# Patient Record
Sex: FEMALE | Race: WHITE | NOT HISPANIC OR LATINO | Employment: OTHER | URBAN - METROPOLITAN AREA
[De-identification: names, ages, dates, MRNs, and addresses within clinical notes are randomized per-mention and may not be internally consistent; named-entity substitution may affect disease eponyms.]

---

## 2017-03-24 ENCOUNTER — HOSPITAL ENCOUNTER (OUTPATIENT)
Dept: FAMILY MEDICINE CLINIC | Facility: CLINIC | Age: 62
Setting detail: SPECIMEN
Discharge: HOME | End: 2017-03-24
Attending: FAMILY MEDICINE | Admitting: FAMILY MEDICINE

## 2017-03-24 ENCOUNTER — CONVERSION ENCOUNTER (OUTPATIENT)
Dept: FAMILY MEDICINE CLINIC | Facility: CLINIC | Age: 62
End: 2017-03-24

## 2017-03-24 LAB
ALBUMIN SERPL-MCNC: 3.8 G/DL (ref 3.5–4.8)
ALBUMIN/GLOB SERPL: 1 {RATIO} (ref 1–1.7)
ALP SERPL-CCNC: 96 IU/L (ref 32–91)
ALT SERPL-CCNC: 22 IU/L (ref 14–54)
ANION GAP SERPL CALC-SCNC: 13.3 MMOL/L (ref 10–20)
AST SERPL-CCNC: 23 IU/L (ref 15–41)
BASOPHILS # BLD AUTO: 0 10*3/UL (ref 0–0.2)
BASOPHILS NFR BLD AUTO: 1 % (ref 0–2)
BILIRUB SERPL-MCNC: 0.7 MG/DL (ref 0.3–1.2)
BUN SERPL-MCNC: 10 MG/DL (ref 8–20)
BUN/CREAT SERPL: 14.3 (ref 5.4–26.2)
CALCIUM SERPL-MCNC: 9.8 MG/DL (ref 8.9–10.3)
CHLORIDE SERPL-SCNC: 103 MMOL/L (ref 101–111)
CHOLEST SERPL-MCNC: 251 MG/DL
CHOLEST/HDLC SERPL: 3.9 {RATIO}
CONV CO2: 28 MMOL/L (ref 22–32)
CONV LDL CHOLESTEROL DIRECT: 157 MG/DL (ref 0–100)
CONV TOTAL PROTEIN: 7.6 G/DL (ref 6.1–7.9)
CREAT UR-MCNC: 0.7 MG/DL (ref 0.4–1)
DIFFERENTIAL METHOD BLD: (no result)
EOSINOPHIL # BLD AUTO: 0.1 10*3/UL (ref 0–0.3)
EOSINOPHIL # BLD AUTO: 1 % (ref 0–3)
ERYTHROCYTE [DISTWIDTH] IN BLOOD BY AUTOMATED COUNT: 16.5 % (ref 11.5–14.5)
GLOBULIN UR ELPH-MCNC: 3.8 G/DL (ref 2.5–3.8)
GLUCOSE SERPL-MCNC: 95 MG/DL (ref 65–99)
HCT VFR BLD AUTO: 35 % (ref 35–49)
HDLC SERPL-MCNC: 64 MG/DL
HGB BLD-MCNC: 11.7 G/DL (ref 12–15)
LDLC/HDLC SERPL: 2.5 {RATIO}
LIPID INTERPRETATION: ABNORMAL
LYMPHOCYTES # BLD AUTO: 2.2 10*3/UL (ref 0.8–4.8)
LYMPHOCYTES NFR BLD AUTO: 28 % (ref 18–42)
MCH RBC QN AUTO: 27.4 PG (ref 26–32)
MCHC RBC AUTO-ENTMCNC: 33.3 G/DL (ref 32–36)
MCV RBC AUTO: 82.2 FL (ref 80–94)
MONOCYTES # BLD AUTO: 0.7 10*3/UL (ref 0.1–1.3)
MONOCYTES NFR BLD AUTO: 9 % (ref 2–11)
NEUTROPHILS # BLD AUTO: 4.8 10*3/UL (ref 2.3–8.6)
NEUTROPHILS NFR BLD AUTO: 61 % (ref 50–75)
NRBC BLD AUTO-RTO: 0 /100{WBCS}
NRBC/RBC NFR BLD MANUAL: 0 10*3/UL
PLATELET # BLD AUTO: 348 10*3/UL (ref 150–450)
PMV BLD AUTO: 7.4 FL (ref 7.4–10.4)
POTASSIUM SERPL-SCNC: 4.3 MMOL/L (ref 3.6–5.1)
RBC # BLD AUTO: 4.26 10*6/UL (ref 4–5.4)
SODIUM SERPL-SCNC: 140 MMOL/L (ref 136–144)
TRIGL SERPL-MCNC: 158 MG/DL
VLDLC SERPL CALC-MCNC: 30 MG/DL
WBC # BLD AUTO: 7.8 10*3/UL (ref 4.5–11.5)

## 2017-08-09 ENCOUNTER — ON CAMPUS - OUTPATIENT (AMBULATORY)
Dept: URBAN - METROPOLITAN AREA HOSPITAL 2 | Facility: HOSPITAL | Age: 62
End: 2017-08-09
Payer: COMMERCIAL

## 2017-08-09 VITALS
OXYGEN SATURATION: 98 % | OXYGEN SATURATION: 100 % | HEIGHT: 64 IN | SYSTOLIC BLOOD PRESSURE: 126 MMHG | RESPIRATION RATE: 16 BRPM | OXYGEN SATURATION: 96 % | SYSTOLIC BLOOD PRESSURE: 106 MMHG | HEART RATE: 49 BPM | HEART RATE: 61 BPM | SYSTOLIC BLOOD PRESSURE: 161 MMHG | DIASTOLIC BLOOD PRESSURE: 64 MMHG | TEMPERATURE: 97.7 F | OXYGEN SATURATION: 99 % | DIASTOLIC BLOOD PRESSURE: 49 MMHG | WEIGHT: 213 LBS | DIASTOLIC BLOOD PRESSURE: 66 MMHG | HEART RATE: 67 BPM | HEART RATE: 65 BPM | DIASTOLIC BLOOD PRESSURE: 90 MMHG | SYSTOLIC BLOOD PRESSURE: 100 MMHG | DIASTOLIC BLOOD PRESSURE: 86 MMHG | RESPIRATION RATE: 18 BRPM | SYSTOLIC BLOOD PRESSURE: 140 MMHG | DIASTOLIC BLOOD PRESSURE: 60 MMHG | HEART RATE: 69 BPM

## 2017-08-09 DIAGNOSIS — Z12.11 ENCOUNTER FOR SCREENING FOR MALIGNANT NEOPLASM OF COLON: ICD-10-CM

## 2017-08-09 DIAGNOSIS — K64.1 SECOND DEGREE HEMORRHOIDS: ICD-10-CM

## 2017-08-09 PROCEDURE — 45378 DIAGNOSTIC COLONOSCOPY: CPT | Mod: 33 | Performed by: INTERNAL MEDICINE

## 2017-08-09 RX ADMIN — PROPOFOL: 10 INJECTION, EMULSION INTRAVENOUS at 09:57

## 2017-08-17 ENCOUNTER — CONVERSION ENCOUNTER (OUTPATIENT)
Dept: FAMILY MEDICINE CLINIC | Facility: CLINIC | Age: 62
End: 2017-08-17

## 2017-09-25 ENCOUNTER — CONVERSION ENCOUNTER (OUTPATIENT)
Dept: FAMILY MEDICINE CLINIC | Facility: CLINIC | Age: 62
End: 2017-09-25

## 2017-09-25 ENCOUNTER — HOSPITAL ENCOUNTER (OUTPATIENT)
Dept: FAMILY MEDICINE CLINIC | Facility: CLINIC | Age: 62
Setting detail: SPECIMEN
Discharge: HOME | End: 2017-09-25
Attending: FAMILY MEDICINE | Admitting: FAMILY MEDICINE

## 2017-11-08 ENCOUNTER — HOSPITAL ENCOUNTER (OUTPATIENT)
Dept: FAMILY MEDICINE CLINIC | Facility: CLINIC | Age: 62
Setting detail: SPECIMEN
Discharge: HOME | End: 2017-11-08
Attending: FAMILY MEDICINE | Admitting: FAMILY MEDICINE

## 2017-11-08 LAB
ALBUMIN SERPL-MCNC: 3.9 G/DL (ref 3.5–4.8)
ALBUMIN/GLOB SERPL: 1.2 {RATIO} (ref 1–1.7)
ALP SERPL-CCNC: 86 IU/L (ref 32–91)
ALT SERPL-CCNC: 20 IU/L (ref 14–54)
ANION GAP SERPL CALC-SCNC: 12.6 MMOL/L (ref 10–20)
AST SERPL-CCNC: 26 IU/L (ref 15–41)
BASOPHILS # BLD AUTO: 0 10*3/UL (ref 0–0.2)
BASOPHILS NFR BLD AUTO: 1 % (ref 0–2)
BILIRUB SERPL-MCNC: 0.7 MG/DL (ref 0.3–1.2)
BUN SERPL-MCNC: 11 MG/DL (ref 8–20)
BUN/CREAT SERPL: 15.7 (ref 5.4–26.2)
CALCIUM SERPL-MCNC: 9.4 MG/DL (ref 8.9–10.3)
CHLORIDE SERPL-SCNC: 104 MMOL/L (ref 101–111)
CONV CO2: 27 MMOL/L (ref 22–32)
CONV TOTAL PROTEIN: 7.2 G/DL (ref 6.1–7.9)
CREAT UR-MCNC: 0.7 MG/DL (ref 0.4–1)
DIFFERENTIAL METHOD BLD: (no result)
EOSINOPHIL # BLD AUTO: 0.1 10*3/UL (ref 0–0.3)
EOSINOPHIL # BLD AUTO: 2 % (ref 0–3)
ERYTHROCYTE [DISTWIDTH] IN BLOOD BY AUTOMATED COUNT: 16.4 % (ref 11.5–14.5)
GLOBULIN UR ELPH-MCNC: 3.3 G/DL (ref 2.5–3.8)
GLUCOSE SERPL-MCNC: 93 MG/DL (ref 65–99)
HCT VFR BLD AUTO: 34 % (ref 35–49)
HGB BLD-MCNC: 10.8 G/DL (ref 12–15)
LYMPHOCYTES # BLD AUTO: 1.6 10*3/UL (ref 0.8–4.8)
LYMPHOCYTES NFR BLD AUTO: 29 % (ref 18–42)
MCH RBC QN AUTO: 25.6 PG (ref 26–32)
MCHC RBC AUTO-ENTMCNC: 31.8 G/DL (ref 32–36)
MCV RBC AUTO: 80.7 FL (ref 80–94)
MONOCYTES # BLD AUTO: 0.5 10*3/UL (ref 0.1–1.3)
MONOCYTES NFR BLD AUTO: 10 % (ref 2–11)
NEUTROPHILS # BLD AUTO: 3.2 10*3/UL (ref 2.3–8.6)
NEUTROPHILS NFR BLD AUTO: 58 % (ref 50–75)
NRBC BLD AUTO-RTO: 0 /100{WBCS}
NRBC/RBC NFR BLD MANUAL: 0 10*3/UL
PLATELET # BLD AUTO: 358 10*3/UL (ref 150–450)
PMV BLD AUTO: 7 FL (ref 7.4–10.4)
POTASSIUM SERPL-SCNC: 4.6 MMOL/L (ref 3.6–5.1)
RBC # BLD AUTO: 4.21 10*6/UL (ref 4–5.4)
SODIUM SERPL-SCNC: 139 MMOL/L (ref 136–144)
WBC # BLD AUTO: 5.4 10*3/UL (ref 4.5–11.5)

## 2018-01-23 ENCOUNTER — HOSPITAL ENCOUNTER (OUTPATIENT)
Dept: INFUSION THERAPY | Facility: HOSPITAL | Age: 63
Discharge: HOME | End: 2018-01-23
Attending: FAMILY MEDICINE | Admitting: FAMILY MEDICINE

## 2018-01-30 ENCOUNTER — HOSPITAL ENCOUNTER (OUTPATIENT)
Dept: INFUSION THERAPY | Facility: HOSPITAL | Age: 63
Discharge: HOME | End: 2018-01-30
Attending: FAMILY MEDICINE | Admitting: FAMILY MEDICINE

## 2018-04-10 ENCOUNTER — HOSPITAL ENCOUNTER (OUTPATIENT)
Dept: OTHER | Facility: HOSPITAL | Age: 63
Discharge: HOME | End: 2018-04-10
Attending: FAMILY MEDICINE | Admitting: FAMILY MEDICINE

## 2018-04-10 ENCOUNTER — CONVERSION ENCOUNTER (OUTPATIENT)
Dept: FAMILY MEDICINE CLINIC | Facility: CLINIC | Age: 63
End: 2018-04-10

## 2018-04-10 ENCOUNTER — HOSPITAL ENCOUNTER (OUTPATIENT)
Dept: FAMILY MEDICINE CLINIC | Facility: CLINIC | Age: 63
Setting detail: SPECIMEN
Discharge: HOME | End: 2018-04-10
Attending: FAMILY MEDICINE | Admitting: FAMILY MEDICINE

## 2018-04-10 LAB
ALBUMIN SERPL-MCNC: 4 G/DL (ref 3.5–4.8)
ALBUMIN/GLOB SERPL: 1.5 {RATIO} (ref 1–1.7)
ALP SERPL-CCNC: 86 IU/L (ref 32–91)
ALT SERPL-CCNC: 23 IU/L (ref 14–54)
ANION GAP SERPL CALC-SCNC: 10.8 MMOL/L (ref 10–20)
AST SERPL-CCNC: 21 IU/L (ref 15–41)
BASOPHILS # BLD AUTO: 0 10*3/UL (ref 0–0.2)
BASOPHILS NFR BLD AUTO: 0 % (ref 0–2)
BILIRUB SERPL-MCNC: 0.7 MG/DL (ref 0.3–1.2)
BUN SERPL-MCNC: 11 MG/DL (ref 8–20)
BUN/CREAT SERPL: 18.3 (ref 5.4–26.2)
CALCIUM SERPL-MCNC: 9.6 MG/DL (ref 8.9–10.3)
CHLORIDE SERPL-SCNC: 102 MMOL/L (ref 101–111)
CONV CO2: 28 MMOL/L (ref 22–32)
CONV TOTAL PROTEIN: 6.7 G/DL (ref 6.1–7.9)
CREAT UR-MCNC: 0.6 MG/DL (ref 0.4–1)
DIFFERENTIAL METHOD BLD: (no result)
EOSINOPHIL # BLD AUTO: 0.1 10*3/UL (ref 0–0.3)
EOSINOPHIL # BLD AUTO: 1 % (ref 0–3)
ERYTHROCYTE [DISTWIDTH] IN BLOOD BY AUTOMATED COUNT: 20.6 % (ref 11.5–14.5)
GLOBULIN UR ELPH-MCNC: 2.7 G/DL (ref 2.5–3.8)
GLUCOSE SERPL-MCNC: 89 MG/DL (ref 65–99)
HCT VFR BLD AUTO: 40.5 % (ref 35–49)
HGB BLD-MCNC: 13.7 G/DL (ref 12–15)
LYMPHOCYTES # BLD AUTO: 2.3 10*3/UL (ref 0.8–4.8)
LYMPHOCYTES NFR BLD AUTO: 26 % (ref 18–42)
MCH RBC QN AUTO: 29.6 PG (ref 26–32)
MCHC RBC AUTO-ENTMCNC: 33.7 G/DL (ref 32–36)
MCV RBC AUTO: 87.9 FL (ref 80–94)
MONOCYTES # BLD AUTO: 0.6 10*3/UL (ref 0.1–1.3)
MONOCYTES NFR BLD AUTO: 7 % (ref 2–11)
NEUTROPHILS # BLD AUTO: 5.7 10*3/UL (ref 2.3–8.6)
NEUTROPHILS NFR BLD AUTO: 66 % (ref 50–75)
NRBC BLD AUTO-RTO: 0 /100{WBCS}
NRBC/RBC NFR BLD MANUAL: 0 10*3/UL
PLATELET # BLD AUTO: 332 10*3/UL (ref 150–450)
PMV BLD AUTO: 6.6 FL (ref 7.4–10.4)
POTASSIUM SERPL-SCNC: 3.8 MMOL/L (ref 3.6–5.1)
RBC # BLD AUTO: 4.61 10*6/UL (ref 4–5.4)
SODIUM SERPL-SCNC: 137 MMOL/L (ref 136–144)
WBC # BLD AUTO: 8.7 10*3/UL (ref 4.5–11.5)

## 2018-04-12 ENCOUNTER — CONVERSION ENCOUNTER (OUTPATIENT)
Dept: FAMILY MEDICINE CLINIC | Facility: CLINIC | Age: 63
End: 2018-04-12

## 2018-04-12 ENCOUNTER — HOSPITAL ENCOUNTER (OUTPATIENT)
Dept: CARDIOLOGY | Facility: HOSPITAL | Age: 63
Discharge: HOME | End: 2018-04-12
Attending: INTERNAL MEDICINE | Admitting: INTERNAL MEDICINE

## 2018-10-18 ENCOUNTER — CONVERSION ENCOUNTER (OUTPATIENT)
Dept: FAMILY MEDICINE CLINIC | Facility: CLINIC | Age: 63
End: 2018-10-18

## 2018-10-18 ENCOUNTER — HOSPITAL ENCOUNTER (OUTPATIENT)
Dept: FAMILY MEDICINE CLINIC | Facility: CLINIC | Age: 63
Setting detail: SPECIMEN
Discharge: HOME | End: 2018-10-18
Attending: FAMILY MEDICINE | Admitting: FAMILY MEDICINE

## 2018-10-18 LAB
25(OH)D3 SERPL-MCNC: 89 NG/ML (ref 30–100)
ALBUMIN SERPL-MCNC: 4.2 G/DL (ref 3.5–4.8)
ALBUMIN/GLOB SERPL: 1.5 {RATIO} (ref 1–1.7)
ALP SERPL-CCNC: 89 IU/L (ref 32–91)
ALT SERPL-CCNC: 19 IU/L (ref 14–54)
ANION GAP SERPL CALC-SCNC: 12.4 MMOL/L (ref 10–20)
AST SERPL-CCNC: 18 IU/L (ref 15–41)
BASOPHILS # BLD AUTO: 0 10*3/UL (ref 0–0.2)
BASOPHILS NFR BLD AUTO: 1 % (ref 0–2)
BILIRUB SERPL-MCNC: 0.7 MG/DL (ref 0.3–1.2)
BUN SERPL-MCNC: 7 MG/DL (ref 8–20)
BUN/CREAT SERPL: 11.7 (ref 5.4–26.2)
CALCIUM SERPL-MCNC: 9.7 MG/DL (ref 8.9–10.3)
CHLORIDE SERPL-SCNC: 100 MMOL/L (ref 101–111)
CHOLEST SERPL-MCNC: 131 MG/DL
CHOLEST/HDLC SERPL: 2.6 {RATIO}
CONV CO2: 29 MMOL/L (ref 22–32)
CONV LDL CHOLESTEROL DIRECT: 75 MG/DL (ref 0–100)
CONV TOTAL PROTEIN: 7 G/DL (ref 6.1–7.9)
CREAT UR-MCNC: 0.6 MG/DL (ref 0.4–1)
DIFFERENTIAL METHOD BLD: (no result)
EOSINOPHIL # BLD AUTO: 0.1 10*3/UL (ref 0–0.3)
EOSINOPHIL # BLD AUTO: 1 % (ref 0–3)
ERYTHROCYTE [DISTWIDTH] IN BLOOD BY AUTOMATED COUNT: 13.7 % (ref 11.5–14.5)
GLOBULIN UR ELPH-MCNC: 2.8 G/DL (ref 2.5–3.8)
GLUCOSE SERPL-MCNC: 100 MG/DL (ref 65–99)
HCT VFR BLD AUTO: 43.2 % (ref 35–49)
HDLC SERPL-MCNC: 50 MG/DL
HGB BLD-MCNC: 14.6 G/DL (ref 12–15)
LDLC/HDLC SERPL: 1.5 {RATIO}
LIPID INTERPRETATION: NORMAL
LYMPHOCYTES # BLD AUTO: 1.5 10*3/UL (ref 0.8–4.8)
LYMPHOCYTES NFR BLD AUTO: 21 % (ref 18–42)
MCH RBC QN AUTO: 31.1 PG (ref 26–32)
MCHC RBC AUTO-ENTMCNC: 33.8 G/DL (ref 32–36)
MCV RBC AUTO: 92 FL (ref 80–94)
MONOCYTES # BLD AUTO: 0.7 10*3/UL (ref 0.1–1.3)
MONOCYTES NFR BLD AUTO: 10 % (ref 2–11)
NEUTROPHILS # BLD AUTO: 4.8 10*3/UL (ref 2.3–8.6)
NEUTROPHILS NFR BLD AUTO: 67 % (ref 50–75)
NRBC BLD AUTO-RTO: 0 /100{WBCS}
NRBC/RBC NFR BLD MANUAL: 0 10*3/UL
PLATELET # BLD AUTO: 352 10*3/UL (ref 150–450)
PMV BLD AUTO: 7.5 FL (ref 7.4–10.4)
POTASSIUM SERPL-SCNC: 4.4 MMOL/L (ref 3.6–5.1)
RBC # BLD AUTO: 4.69 10*6/UL (ref 4–5.4)
SODIUM SERPL-SCNC: 137 MMOL/L (ref 136–144)
TRIGL SERPL-MCNC: 43 MG/DL
VIT B12 SERPL-MCNC: 605 PG/ML (ref 180–914)
VLDLC SERPL CALC-MCNC: 5.7 MG/DL
WBC # BLD AUTO: 7.1 10*3/UL (ref 4.5–11.5)

## 2018-12-13 ENCOUNTER — CONVERSION ENCOUNTER (OUTPATIENT)
Dept: FAMILY MEDICINE CLINIC | Facility: CLINIC | Age: 63
End: 2018-12-13

## 2019-04-15 ENCOUNTER — HOSPITAL ENCOUNTER (OUTPATIENT)
Dept: FAMILY MEDICINE CLINIC | Facility: CLINIC | Age: 64
Setting detail: SPECIMEN
Discharge: HOME | End: 2019-04-15
Attending: NURSE PRACTITIONER | Admitting: NURSE PRACTITIONER

## 2019-04-15 ENCOUNTER — CONVERSION ENCOUNTER (OUTPATIENT)
Dept: FAMILY MEDICINE CLINIC | Facility: CLINIC | Age: 64
End: 2019-04-15

## 2019-04-15 LAB
BASOPHILS # BLD AUTO: 0 10*3/UL (ref 0–0.2)
BASOPHILS NFR BLD AUTO: 1 % (ref 0–2)
DIFFERENTIAL METHOD BLD: (no result)
EOSINOPHIL # BLD AUTO: 0.1 10*3/UL (ref 0–0.3)
EOSINOPHIL # BLD AUTO: 2 % (ref 0–3)
ERYTHROCYTE [DISTWIDTH] IN BLOOD BY AUTOMATED COUNT: 12.8 % (ref 11.5–14.5)
HCT VFR BLD AUTO: 42.3 % (ref 35–49)
HGB BLD-MCNC: 14.3 G/DL (ref 12–15)
LYMPHOCYTES # BLD AUTO: 2.1 10*3/UL (ref 0.8–4.8)
LYMPHOCYTES NFR BLD AUTO: 30 % (ref 18–42)
MCH RBC QN AUTO: 32.2 PG (ref 26–32)
MCHC RBC AUTO-ENTMCNC: 33.9 G/DL (ref 32–36)
MCV RBC AUTO: 94.9 FL (ref 80–94)
MONOCYTES # BLD AUTO: 0.5 10*3/UL (ref 0.1–1.3)
MONOCYTES NFR BLD AUTO: 8 % (ref 2–11)
NEUTROPHILS # BLD AUTO: 4.2 10*3/UL (ref 2.3–8.6)
NEUTROPHILS NFR BLD AUTO: 59 % (ref 50–75)
NRBC BLD AUTO-RTO: 0 /100{WBCS}
NRBC/RBC NFR BLD MANUAL: 0 10*3/UL
PLATELET # BLD AUTO: 336 10*3/UL (ref 150–450)
PMV BLD AUTO: 7 FL (ref 7.4–10.4)
RBC # BLD AUTO: 4.46 10*6/UL (ref 4–5.4)
WBC # BLD AUTO: 7 10*3/UL (ref 4.5–11.5)

## 2019-06-04 VITALS
DIASTOLIC BLOOD PRESSURE: 85 MMHG | HEART RATE: 65 BPM | WEIGHT: 209.4 LBS | WEIGHT: 216 LBS | BODY MASS INDEX: 37.56 KG/M2 | DIASTOLIC BLOOD PRESSURE: 76 MMHG | HEART RATE: 61 BPM | WEIGHT: 220 LBS | RESPIRATION RATE: 16 BRPM | SYSTOLIC BLOOD PRESSURE: 128 MMHG | RESPIRATION RATE: 16 BRPM | DIASTOLIC BLOOD PRESSURE: 84 MMHG | OXYGEN SATURATION: 97 % | OXYGEN SATURATION: 97 % | HEART RATE: 85 BPM | OXYGEN SATURATION: 97 % | SYSTOLIC BLOOD PRESSURE: 140 MMHG | SYSTOLIC BLOOD PRESSURE: 132 MMHG | HEIGHT: 64 IN

## 2019-06-04 VITALS
WEIGHT: 183.2 LBS | HEIGHT: 64 IN | BODY MASS INDEX: 38.62 KG/M2 | BODY MASS INDEX: 28.91 KG/M2 | OXYGEN SATURATION: 96 % | DIASTOLIC BLOOD PRESSURE: 76 MMHG | BODY MASS INDEX: 38.17 KG/M2 | HEART RATE: 62 BPM | HEART RATE: 67 BPM | HEART RATE: 67 BPM | DIASTOLIC BLOOD PRESSURE: 83 MMHG | HEIGHT: 64 IN | SYSTOLIC BLOOD PRESSURE: 127 MMHG | SYSTOLIC BLOOD PRESSURE: 128 MMHG | HEIGHT: 64 IN | HEART RATE: 62 BPM | BODY MASS INDEX: 31.28 KG/M2 | SYSTOLIC BLOOD PRESSURE: 131 MMHG | DIASTOLIC BLOOD PRESSURE: 84 MMHG | SYSTOLIC BLOOD PRESSURE: 131 MMHG | RESPIRATION RATE: 16 BRPM | RESPIRATION RATE: 16 BRPM | RESPIRATION RATE: 16 BRPM | WEIGHT: 138.9 LBS | HEART RATE: 51 BPM | OXYGEN SATURATION: 97 % | DIASTOLIC BLOOD PRESSURE: 75 MMHG | SYSTOLIC BLOOD PRESSURE: 146 MMHG | OXYGEN SATURATION: 100 % | WEIGHT: 168.4 LBS | OXYGEN SATURATION: 94 % | WEIGHT: 225 LBS | DIASTOLIC BLOOD PRESSURE: 82 MMHG | WEIGHT: 223.6 LBS | BODY MASS INDEX: 23.71 KG/M2 | OXYGEN SATURATION: 99 %

## 2019-08-28 ENCOUNTER — APPOINTMENT (OUTPATIENT)
Dept: LAB | Facility: HOSPITAL | Age: 64
End: 2019-08-28

## 2019-08-28 ENCOUNTER — OFFICE VISIT (OUTPATIENT)
Dept: FAMILY MEDICINE CLINIC | Facility: CLINIC | Age: 64
End: 2019-08-28

## 2019-08-28 VITALS
TEMPERATURE: 98.2 F | WEIGHT: 135.5 LBS | HEIGHT: 64 IN | SYSTOLIC BLOOD PRESSURE: 127 MMHG | BODY MASS INDEX: 23.13 KG/M2 | RESPIRATION RATE: 20 BRPM | OXYGEN SATURATION: 98 % | HEART RATE: 52 BPM | DIASTOLIC BLOOD PRESSURE: 76 MMHG

## 2019-08-28 DIAGNOSIS — E78.5 HYPERLIPIDEMIA, UNSPECIFIED HYPERLIPIDEMIA TYPE: Primary | ICD-10-CM

## 2019-08-28 DIAGNOSIS — I10 HYPERTENSION, UNSPECIFIED TYPE: ICD-10-CM

## 2019-08-28 DIAGNOSIS — R59.0 PREAURICULAR LYMPHADENOPATHY: ICD-10-CM

## 2019-08-28 DIAGNOSIS — R21 RASH AND NONSPECIFIC SKIN ERUPTION: ICD-10-CM

## 2019-08-28 PROBLEM — D64.9 ANEMIA: Status: ACTIVE | Noted: 2017-11-07

## 2019-08-28 PROBLEM — E55.9 VITAMIN D DEFICIENCY: Status: ACTIVE | Noted: 2018-10-18

## 2019-08-28 PROBLEM — E53.8 B12 DEFICIENCY: Status: ACTIVE | Noted: 2017-09-25

## 2019-08-28 LAB
ALBUMIN SERPL-MCNC: 4.2 G/DL (ref 3.5–4.8)
ALBUMIN/GLOB SERPL: 2 G/DL (ref 1–1.7)
ALP SERPL-CCNC: 63 U/L (ref 32–91)
ALT SERPL W P-5'-P-CCNC: 21 U/L (ref 14–54)
ANION GAP SERPL CALCULATED.3IONS-SCNC: 15.1 MMOL/L (ref 5–15)
ARTICHOKE IGE QN: 80 MG/DL (ref 0–100)
AST SERPL-CCNC: 19 U/L (ref 15–41)
BILIRUB SERPL-MCNC: 1.1 MG/DL (ref 0.3–1.2)
BUN BLD-MCNC: 12 MG/DL (ref 8–20)
BUN/CREAT SERPL: 20 (ref 5.4–26.2)
CALCIUM SPEC-SCNC: 9.2 MG/DL (ref 8.9–10.3)
CHLORIDE SERPL-SCNC: 102 MMOL/L (ref 101–111)
CHOLEST SERPL-MCNC: 149 MG/DL
CO2 SERPL-SCNC: 25 MMOL/L (ref 22–32)
CREAT BLD-MCNC: 0.6 MG/DL (ref 0.4–1)
FOLATE SERPL-MCNC: >24.1 NG/ML (ref 5.9–24.8)
GFR SERPL CREATININE-BSD FRML MDRD: 101 ML/MIN/1.73
GLOBULIN UR ELPH-MCNC: 2.1 GM/DL (ref 2.5–3.8)
GLUCOSE BLD-MCNC: 88 MG/DL (ref 65–99)
HDLC SERPL QL: 2.37
HDLC SERPL-MCNC: 63 MG/DL
LDLC/HDLC SERPL: 1.2 {RATIO}
POTASSIUM BLD-SCNC: 4.1 MMOL/L (ref 3.6–5.1)
PROT SERPL-MCNC: 6.3 G/DL (ref 6.1–7.9)
SODIUM BLD-SCNC: 138 MMOL/L (ref 136–144)
TRIGL SERPL-MCNC: 52 MG/DL
VIT B12 BLD-MCNC: 338 PG/ML (ref 180–914)
VLDLC SERPL-MCNC: 10.4 MG/DL

## 2019-08-28 PROCEDURE — 82746 ASSAY OF FOLIC ACID SERUM: CPT | Performed by: NURSE PRACTITIONER

## 2019-08-28 PROCEDURE — 99214 OFFICE O/P EST MOD 30 MIN: CPT | Performed by: NURSE PRACTITIONER

## 2019-08-28 PROCEDURE — 82607 VITAMIN B-12: CPT | Performed by: NURSE PRACTITIONER

## 2019-08-28 PROCEDURE — 80061 LIPID PANEL: CPT | Performed by: NURSE PRACTITIONER

## 2019-08-28 PROCEDURE — 80053 COMPREHEN METABOLIC PANEL: CPT | Performed by: NURSE PRACTITIONER

## 2019-08-28 PROCEDURE — 36415 COLL VENOUS BLD VENIPUNCTURE: CPT | Performed by: NURSE PRACTITIONER

## 2019-08-28 RX ORDER — ATORVASTATIN CALCIUM 10 MG/1
10 TABLET, FILM COATED ORAL
COMMUNITY
Start: 2018-05-09 | End: 2020-04-14

## 2019-08-28 RX ORDER — CYANOCOBALAMIN 1000 UG/ML
INJECTION, SOLUTION INTRAMUSCULAR; SUBCUTANEOUS
COMMUNITY
Start: 2018-09-12 | End: 2020-03-30

## 2019-08-28 RX ORDER — SYRINGE WITH NEEDLE, 1 ML 25GX5/8"
SYRINGE, EMPTY DISPOSABLE MISCELLANEOUS
Refills: 0 | COMMUNITY
Start: 2019-05-24

## 2019-08-28 RX ORDER — SYRINGE W-NEEDLE,DISPOSAB,3 ML 25GX5/8"
SYRINGE, EMPTY DISPOSABLE MISCELLANEOUS
COMMUNITY
Start: 2017-09-25

## 2019-08-28 RX ORDER — CARVEDILOL 6.25 MG/1
6.25 TABLET ORAL 2 TIMES DAILY WITH MEALS
COMMUNITY
Start: 2019-07-11 | End: 2020-04-14

## 2019-08-28 NOTE — PROGRESS NOTES
"Subjective   Madie Lopez is a 64 y.o. female.     Chief Complaint   Patient presents with   • Cyst     left ino of face by ear X 1.5 months, comes and goes   • Abrasion     sores on arms X 2-3 weeks       HPI  She is here because of rash on arms that comes and goes. Itches when active.then just looks like small abrasions. She is using neosporin. Then rash got worse.  Has noticed over past month and 1/2 infront left ear swollen lymph node. Comes and goes. Has ruptured TM left ear but that has been there along time. No ear drng. No changes from normal ear stuff she reports.     Hypertension; she is only taking her carvidiol she was able to stop the other medication and she checks blood pressures . No highs reported.     Hyperlipidemia; she is currently taking 1/2 dose of atorvastatin needs labs goal is to come off statin.     b12 deficencty due to prenicuos anemia.  Using b 12 inections       The following portions of the patient's history were reviewed and updated as appropriate: allergies, current medications, past family history, past medical history, past social history, past surgical history and problem list.      Current Outpatient Medications:   •  atorvastatin (LIPITOR) 10 MG tablet, Daily., Disp: , Rfl:   •  B-D 3CC LUER-KATHIE SYR 23GX1\" 23G X 1\" 3 ML misc, USE FOR B 12 INJECTIONS, Disp: , Rfl: 0  •  carvedilol (COREG) 6.25 MG tablet, , Disp: , Rfl:   •  cyanocobalamin 1000 MCG/ML injection, CYANOCOBALAMIN 1000 MCG/ML SOLN, Disp: , Rfl:   •  Syringe 23G X 1\" 3 ML misc, SYRINGE 23G X 1\" 3 ML, Disp: , Rfl:     Recent Results (from the past 4032 hour(s))   CBC & Differential    Collection Time: 04/15/19  1:52 PM   Result Value Ref Range    WBC 7.0 4.5 - 11.5 10*3/uL    RBC 4.46 4.00 - 5.40 10*6/uL    Hemoglobin 14.3 12.0 - 15.0 g/dL    Hematocrit 42.3 35 - 49 %    MCV 94.9 (H) 80 - 94 fL    MCH 32.2 (H) 26 - 32 pg    MCHC 33.9 32 - 36 g/dL    RDW 12.8 11.5 - 14.5 %    Platelets 336 150 - 450 10*3/uL    " "MPV 7.0 (L) 7.4 - 10.4 fL    Differential Type AUTO     Neutrophils Absolute 4.2 2.3 - 8.6 10*3/uL    Lymphocytes Absolute 2.1 0.8 - 4.8 10*3/uL    Monocytes Absolute 0.5 0.1 - 1.3 10*3/uL    Eosinophils Absolute 0.1 0.0 - 0.3 10*3/uL    Basophils Absolute 0.0 0 - 0.2 10*3/uL    Neutrophil Rel % 59 50 - 75 %    Lymphocyte Rel % 30 18 - 42 %    Monocyte Rel % 8 2 - 11 %    Eosinophil Rel % 2 0 - 3 %    Basophil Rel % 1 0 - 2 %    nRBC 0 0 /100[WBCs]    Absolute nRBC 0 10*3/uL         Review of Systems   Constitutional: Negative for chills and fever.   HENT: Positive for hearing loss. Negative for congestion, sinus pressure and swollen glands.         Ruptured TM left ear.    Eyes: Negative for blurred vision and pain.   Respiratory: Negative for cough and shortness of breath.    Cardiovascular: Negative for chest pain and leg swelling.   Gastrointestinal: Negative for abdominal pain, nausea and indigestion.   Endocrine: Negative for cold intolerance, heat intolerance, polydipsia, polyphagia and polyuria.   Skin: Negative for dry skin, rash and bruise.   Hematological: Positive for adenopathy.        Swollen lymph node in front left ear only.   Psychiatric/Behavioral: Negative for dysphoric mood and stress.       Objective     /76 (BP Location: Left arm, Patient Position: Sitting, Cuff Size: Adult)   Pulse 52   Temp 98.2 °F (36.8 °C) (Oral)   Resp 20   Ht 162.6 cm (64\")   Wt 61.5 kg (135 lb 8 oz)   SpO2 98%   BMI 23.26 kg/m²     Physical Exam   Constitutional: She is oriented to person, place, and time. She appears well-developed and well-nourished.   HENT:   Head: Normocephalic and atraumatic.   Right Ear: Ear canal normal.   Left Ear: Ear canal normal. Tympanic membrane is perforated.   Eyes: Conjunctivae and EOM are normal. Pupils are equal, round, and reactive to light.   Neck: Normal range of motion. Neck supple.   Cardiovascular: Normal rate, regular rhythm, normal heart sounds and intact distal " pulses.   Pulmonary/Chest: Effort normal and breath sounds normal.   Abdominal: Soft. Bowel sounds are normal.   Musculoskeletal: Normal range of motion.   Lymphadenopathy:        Head (left side): Preauricular adenopathy present.   Small reactive node . Left only   Neurological: She is alert and oriented to person, place, and time.   Skin: Skin is warm and dry. Turgor is normal. Rash noted.        Psychiatric: She has a normal mood and affect. Her behavior is normal.   Nursing note and vitals reviewed.        Assessment/Plan   Madie was seen today for cyst and abrasion.    Diagnoses and all orders for this visit:    Hyperlipidemia, unspecified hyperlipidemia type  -     Lipid Panel  -     Comprehensive Metabolic Panel  -     Vitamin B12  -     Folate    Rash and nonspecific skin eruption  Comments:  stop the neosporin due to sulfa sensitivity use the bendadryl cream for itching    Preauricular lymphadenopathy  Comments:  f/u with ENT if continues.      Hypertension, unspecified type  Comments:  significant weight loss. having lower blood pressures as reported. hold 2 nd dose carvidiol if symptomatic      Patient Instructions   F/u with cardiologist.   Monitor blood pressure check before 2nd dose yasemin if symptomatic. If less than 100 on top hold medicaiton.       Chantell Church, APRN    08/28/19

## 2019-08-28 NOTE — PATIENT INSTRUCTIONS
F/u with cardiologist.   Monitor blood pressure check before 2nd dose yasemin if symptomatic. If less than 100 on top hold medicaiton.

## 2019-09-10 RX ORDER — ASPIRIN 81 MG/1
TABLET ORAL
COMMUNITY
Start: 2014-06-12

## 2019-09-10 RX ORDER — TRIAMCINOLONE ACETONIDE 55 UG/1
SPRAY, METERED NASAL
COMMUNITY
Start: 2017-03-24 | End: 2021-06-10

## 2019-09-18 ENCOUNTER — OFFICE VISIT (OUTPATIENT)
Dept: CARDIOLOGY | Facility: CLINIC | Age: 64
End: 2019-09-18

## 2019-09-18 VITALS
BODY MASS INDEX: 23.69 KG/M2 | DIASTOLIC BLOOD PRESSURE: 83 MMHG | WEIGHT: 138.75 LBS | HEART RATE: 54 BPM | OXYGEN SATURATION: 99 % | SYSTOLIC BLOOD PRESSURE: 143 MMHG | HEIGHT: 64 IN

## 2019-09-18 DIAGNOSIS — I10 ESSENTIAL HYPERTENSION: ICD-10-CM

## 2019-09-18 DIAGNOSIS — E78.00 PURE HYPERCHOLESTEROLEMIA: ICD-10-CM

## 2019-09-18 DIAGNOSIS — I25.118 CORONARY ARTERY DISEASE OF NATIVE ARTERY OF NATIVE HEART WITH STABLE ANGINA PECTORIS (HCC): Primary | ICD-10-CM

## 2019-09-18 PROCEDURE — 93000 ELECTROCARDIOGRAM COMPLETE: CPT | Performed by: INTERNAL MEDICINE

## 2019-09-18 PROCEDURE — 99213 OFFICE O/P EST LOW 20 MIN: CPT | Performed by: INTERNAL MEDICINE

## 2019-09-18 NOTE — PROGRESS NOTES
"    Subjective:     Encounter Date:09/18/2019      Patient ID: Madie Lopez is a 64 y.o. female.    Chief Complaint:  History of Present Illness 84-year-old white female with history of coronary disease status post stent placement the past history of hypertension hyperlipidemia presents to my office for follow-up.  Is currently stable without any signs of chest pain or shortness of breath at rest on exertion.  No complaints of any PND orthopnea.  No palpitations dizziness syncope or swelling of the feet.  She is taking her medicines regularly.  She last over 90 pounds of weight in the last 1 year.  Does not smoke.    The following portions of the patient's history were reviewed and updated as appropriate: allergies, current medications, past family history, past medical history, past social history, past surgical history and problem list.  Past Medical History:   Diagnosis Date   • CAD (coronary artery disease)    • Hyperlipidemia    • Hypertension      Past Surgical History:   Procedure Laterality Date   • CATARACT EXTRACTION WITH INTRAOCULAR LENS IMPLANT  2014   • TOTAL KNEE ARTHROPLASTY       /83   Pulse 54   Ht 162.6 cm (64\")   Wt 62.9 kg (138 lb 12 oz)   SpO2 99%   BMI 23.82 kg/m²   Family History   Problem Relation Age of Onset   • Hypertension Mother    • Heart disease Mother    • Heart disease Father    • Diabetes Father    • Arthritis Father        Current Outpatient Medications:   •  aspirin (ASPIR-LOW) 81 MG EC tablet, ASPIR-LOW 81 MG TBEC, Disp: , Rfl:   •  atorvastatin (LIPITOR) 10 MG tablet, Take 10 mg by mouth. Take 5mg by mouth daily, Disp: , Rfl:   •  B-D 3CC LUER-KATHIE SYR 23GX1\" 23G X 1\" 3 ML misc, USE FOR B 12 INJECTIONS, Disp: , Rfl: 0  •  carvedilol (COREG) 6.25 MG tablet, Take 6.25 mg by mouth 2 (Two) Times a Day With Meals., Disp: , Rfl:   •  cyanocobalamin 1000 MCG/ML injection, CYANOCOBALAMIN 1000 MCG/ML SOLN, Disp: , Rfl:   •  Syringe 23G X 1\" 3 ML misc, SYRINGE 23G X 1\" " 3 ML, Disp: , Rfl:   •  Triamcinolone Acetonide (NASACORT ALLERGY 24HR) 55 MCG/ACT nasal inhaler, NASACORT ALLERGY 24HR 55 MCG/ACT AERO, Disp: , Rfl:   Allergies   Allergen Reactions   • Sulfa Antibiotics Rash     Social History     Socioeconomic History   • Marital status:      Spouse name: Not on file   • Number of children: Not on file   • Years of education: Not on file   • Highest education level: Not on file   Tobacco Use   • Smoking status: Never Smoker   • Smokeless tobacco: Never Used   Substance and Sexual Activity   • Alcohol use: No     Frequency: Never   • Drug use: No     Review of Systems   Constitution: Positive for malaise/fatigue. Negative for fever.   Cardiovascular: Negative for chest pain, dyspnea on exertion and palpitations.   Respiratory: Negative for cough and shortness of breath.    Skin: Negative for rash.   Gastrointestinal: Negative for abdominal pain, nausea and vomiting.   Neurological: Positive for headaches. Negative for focal weakness and light-headedness.   All other systems reviewed and are negative.             Objective:     Physical Exam   Constitutional: She appears well-developed and well-nourished.   HENT:   Head: Normocephalic and atraumatic.   Eyes: Conjunctivae are normal. No scleral icterus.   Neck: Normal range of motion. Neck supple. No JVD present. Carotid bruit is not present.   Cardiovascular: Normal rate, regular rhythm, S1 normal, S2 normal, normal heart sounds and intact distal pulses. PMI is not displaced.   Pulmonary/Chest: Effort normal and breath sounds normal. She has no wheezes. She has no rales.   Abdominal: Soft. Bowel sounds are normal.   Neurological: She is alert. She has normal strength.   Skin: Skin is warm and dry. No rash noted.       ECG 12 Lead  Date/Time: 9/18/2019 9:43 AM  Performed by: Cj Ortega MD  Authorized by: Cj Ortega MD   Comments: Sinus rhythm with nonspecific ST segment normality  No new changes from previous  EKG            Lab Review:       Assessment:          Diagnosis Plan   1. Coronary artery disease of native artery of native heart with stable angina pectoris (CMS/HCC)     2. Essential hypertension     3. Pure hypercholesterolemia            Plan:       Patient has history of colitis followed by stent placed in the past and now has normal LV function with stable angina  Patient's blood pressure and heart rate are stable per  Patient's lipid levels are well within normal limits and she is on a cholesterol medicine  Patient blood pressure and heart are low and hence I will decrease the carvedilol to 3.125 mg twice daily

## 2019-10-14 ENCOUNTER — OFFICE VISIT (OUTPATIENT)
Dept: FAMILY MEDICINE CLINIC | Facility: CLINIC | Age: 64
End: 2019-10-14

## 2019-10-14 VITALS
HEIGHT: 64 IN | TEMPERATURE: 98.5 F | HEART RATE: 52 BPM | SYSTOLIC BLOOD PRESSURE: 138 MMHG | RESPIRATION RATE: 20 BRPM | DIASTOLIC BLOOD PRESSURE: 79 MMHG | BODY MASS INDEX: 23.29 KG/M2 | OXYGEN SATURATION: 100 % | WEIGHT: 136.4 LBS

## 2019-10-14 DIAGNOSIS — E53.8 B12 DEFICIENCY: ICD-10-CM

## 2019-10-14 DIAGNOSIS — I25.10 ATHEROSCLEROSIS OF NATIVE CORONARY ARTERY OF NATIVE HEART WITHOUT ANGINA PECTORIS: ICD-10-CM

## 2019-10-14 DIAGNOSIS — D64.9 ANEMIA, UNSPECIFIED TYPE: ICD-10-CM

## 2019-10-14 DIAGNOSIS — E78.5 HYPERLIPIDEMIA, UNSPECIFIED HYPERLIPIDEMIA TYPE: ICD-10-CM

## 2019-10-14 DIAGNOSIS — I10 ESSENTIAL HYPERTENSION: Primary | ICD-10-CM

## 2019-10-14 PROBLEM — R21 RASH AND NONSPECIFIC SKIN ERUPTION: Status: RESOLVED | Noted: 2019-08-28 | Resolved: 2019-10-14

## 2019-10-14 PROBLEM — R59.0 PREAURICULAR LYMPHADENOPATHY: Status: RESOLVED | Noted: 2019-08-28 | Resolved: 2019-10-14

## 2019-10-14 PROCEDURE — 99214 OFFICE O/P EST MOD 30 MIN: CPT | Performed by: FAMILY MEDICINE

## 2019-10-14 NOTE — PATIENT INSTRUCTIONS
Stop taking the Lipitor.    Have a repeat Lipid profile in 3 months.    Continue your other medications.    Follow up in the office in 6 months.

## 2019-10-14 NOTE — PROGRESS NOTES
"Subjective   Madie Lopez is a 64 y.o. female.     Chief Complaint   Patient presents with   • Hypertension   • Hyperlipidemia   • Anemia       HPI  Chief complaint: Hypertension hyperlipidemia coronary artery disease B12 deficiency anemia    The patient is a 64-year-old white female comes in for follow-up and maintenance of her current problems which include    1.  Hypertension-stable-patient on Coreg 6.25 mg twice a day.  She denied headache lightheadedness dizziness or chest pain.    2.  Hyperlipidemia-stable-patient's on Lipitor 10 mg daily.  She denies myalgias and arthralgias.  She denied nausea or anorexia.    3.  Coronary artery disease-stable-patient on aspirin 81 mg daily.  She denies chest pain shortness of breath orthopnea or PND.    4.  Anemia-stable-patient is on B12 replacement therapy.  Her last CBC was normal.    The patient is lost 90 pounds over the past 2 years.  She states that she feels much better.  She states she would like to stop taking Lipitor if possible.  She was advised to the goal for the LDL cholesterol in patients with coronary artery disease is 70.        The following portions of the patient's history were reviewed and updated as appropriate: allergies, current medications, past family history, past medical history, past social history, past surgical history and problem list.    Review of Systems    Objective     /79 (BP Location: Left arm, Patient Position: Sitting, Cuff Size: Adult)   Pulse 52   Temp 98.5 °F (36.9 °C) (Oral)   Resp 20   Ht 162.6 cm (64\")   Wt 61.9 kg (136 lb 6.4 oz)   SpO2 100%   BMI 23.41 kg/m²     Physical Exam   Constitutional: She is oriented to person, place, and time. She appears well-developed and well-nourished.   HENT:   Head: Normocephalic and atraumatic.   Eyes: Conjunctivae and EOM are normal. Pupils are equal, round, and reactive to light.   Neck: Normal range of motion. Neck supple.   Cardiovascular: Normal rate, regular " rhythm, normal heart sounds and intact distal pulses.   Pulmonary/Chest: Effort normal and breath sounds normal.   Abdominal: Soft. Bowel sounds are normal.   Musculoskeletal: Normal range of motion.   Neurological: She is alert and oriented to person, place, and time.   Skin: Skin is warm and dry.   Psychiatric: She has a normal mood and affect. Her behavior is normal.   Nursing note and vitals reviewed.        Assessment/Plan   Madie was seen today for hypertension, hyperlipidemia and anemia.    Diagnoses and all orders for this visit:    Essential hypertension    Hyperlipidemia, unspecified hyperlipidemia type    Atherosclerosis of native coronary artery of native heart without angina pectoris    B12 deficiency    Anemia, unspecified type      Patient Instructions   Stop taking the Lipitor.    Have a repeat Lipid profile in 3 months.    Continue your other medications.    Follow up in the office in 6 months.        Esteban Daily Jr., MD    10/14/19

## 2020-03-30 RX ORDER — CYANOCOBALAMIN 1000 UG/ML
INJECTION, SOLUTION INTRAMUSCULAR; SUBCUTANEOUS
Qty: 10 ML | Refills: 0 | Status: SHIPPED | OUTPATIENT
Start: 2020-03-30 | End: 2021-02-19 | Stop reason: SDUPTHER

## 2020-04-14 ENCOUNTER — OFFICE VISIT (OUTPATIENT)
Dept: FAMILY MEDICINE CLINIC | Facility: CLINIC | Age: 65
End: 2020-04-14

## 2020-04-14 VITALS — SYSTOLIC BLOOD PRESSURE: 139 MMHG | DIASTOLIC BLOOD PRESSURE: 83 MMHG | OXYGEN SATURATION: 97 % | HEART RATE: 52 BPM

## 2020-04-14 DIAGNOSIS — I25.10 ATHEROSCLEROSIS OF NATIVE CORONARY ARTERY OF NATIVE HEART WITHOUT ANGINA PECTORIS: ICD-10-CM

## 2020-04-14 DIAGNOSIS — E53.8 B12 DEFICIENCY: ICD-10-CM

## 2020-04-14 DIAGNOSIS — I10 ESSENTIAL HYPERTENSION: Primary | ICD-10-CM

## 2020-04-14 DIAGNOSIS — E78.5 HYPERLIPIDEMIA, UNSPECIFIED HYPERLIPIDEMIA TYPE: ICD-10-CM

## 2020-04-14 PROCEDURE — 99442 PR PHYS/QHP TELEPHONE EVALUATION 11-20 MIN: CPT | Performed by: FAMILY MEDICINE

## 2020-04-14 RX ORDER — CARVEDILOL 6.25 MG/1
3.12 TABLET ORAL 2 TIMES DAILY WITH MEALS
Status: SHIPPED | COMMUNITY
Start: 2020-04-14 | End: 2020-06-01

## 2020-04-14 NOTE — PATIENT INSTRUCTIONS
Continue your current medications and treatment.    Follow up in the office in 6 months.    Have the labs at that time.

## 2020-04-14 NOTE — PROGRESS NOTES
This visit has been rescheduled as a phone visit to comply with patient safety concerns in accordance with CDC recommendations. Total time of discussion was 11-20 minutes.

## 2020-04-14 NOTE — PROGRESS NOTES
Subjective   Madie Lopez is a 64 y.o. female.     Chief Complaint   Patient presents with   • Hypertension   • Hyperlipidemia   • Anemia       HPI  Chief complaint: Hypertension hyperlipidemia coronary artery disease B12 deficiency    The patient is a 64-year-old white female who was seen by telephone visit today.    The patient was seen in follow-up and maintenance of her current problems which include    1.  Hypertension-stable-patient is currently on Coreg 3.125 mg twice a day.  She denied headache lightheadedness dizziness or chest pain.    2.  Hyperlipidemia-stable-patient is not currently taking any medications.  Patient has been on a statin drug.  She wanted to go off of it and see what happened with her cholesterol.    3.  Coronary artery disease-stable-patient is currently on aspirin and metoprolol.  She denies chest pain shortness of breath orthopnea or PND.    4.  B12 deficiency-stable-patient on B12 replacement therapy.        The following portions of the patient's history were reviewed and updated as appropriate: allergies, current medications, past family history, past medical history, past social history, past surgical history and problem list.    Review of Systems   Constitutional: Negative for chills and fever.   HENT: Negative for congestion, sinus pressure and swollen glands.    Eyes: Negative for blurred vision and pain.   Respiratory: Negative for cough and shortness of breath.    Cardiovascular: Negative for chest pain and leg swelling.   Gastrointestinal: Negative for abdominal pain, nausea and indigestion.   Endocrine: Negative for cold intolerance, heat intolerance, polydipsia, polyphagia and polyuria.   Skin: Negative for dry skin, rash and bruise.   Neurological: Negative for dizziness, syncope and headache.   Psychiatric/Behavioral: Negative for dysphoric mood and stress.       Objective     /83   Pulse 52   SpO2 97%     Physical Exam      Assessment/Plan   Madie was  seen today for hypertension, hyperlipidemia and anemia.    Diagnoses and all orders for this visit:    Essential hypertension    Hyperlipidemia, unspecified hyperlipidemia type    Atherosclerosis of native coronary artery of native heart without angina pectoris    B12 deficiency      Patient Instructions   Continue your current medications and treatment.    Follow up in the office in 6 months.    Have the labs at that time.      Esteban Daily Jr., MD    04/14/20

## 2020-06-01 RX ORDER — CARVEDILOL 6.25 MG/1
TABLET ORAL
Qty: 180 TABLET | Refills: 0 | Status: SHIPPED | OUTPATIENT
Start: 2020-06-01 | End: 2020-12-11 | Stop reason: SDUPTHER

## 2020-06-19 ENCOUNTER — TELEMEDICINE (OUTPATIENT)
Dept: FAMILY MEDICINE CLINIC | Facility: TELEHEALTH | Age: 65
End: 2020-06-19

## 2020-06-19 DIAGNOSIS — R39.89 SUSPECTED UTI: Primary | ICD-10-CM

## 2020-06-19 PROCEDURE — 99442 PR PHYS/QHP TELEPHONE EVALUATION 11-20 MIN: CPT | Performed by: PHYSICIAN ASSISTANT

## 2020-06-19 RX ORDER — NITROFURANTOIN 25; 75 MG/1; MG/1
100 CAPSULE ORAL 2 TIMES DAILY
Qty: 10 CAPSULE | Refills: 0 | Status: SHIPPED | OUTPATIENT
Start: 2020-06-19 | End: 2020-06-24

## 2020-06-19 RX ORDER — PHENAZOPYRIDINE HYDROCHLORIDE 200 MG/1
200 TABLET, FILM COATED ORAL 3 TIMES DAILY PRN
Qty: 6 TABLET | Refills: 0 | Status: SHIPPED | OUTPATIENT
Start: 2020-06-19 | End: 2020-06-21

## 2020-06-19 NOTE — PROGRESS NOTES
Madie Mancinistellasmelquiades    1955  [id number]    I have reviewed the e-Visit questionnaire and patient's answers via E video visit via telephone.  My assessment and plan are as follows:    Chief Complaint   Patient presents with   • Urinary Frequency        HISTORY OF PRESENT ILLNESS  HPI   Pt is a 65 yr old f who presents via telephone with c/o urinary frequency that began today.  Associated with feeling of incomplete emptying, dysuria, foul smelling urine, nausea, and malaise.  Pt denies back pain, fever, vomiting, hematuria, and vaginal discharge.  Last UTI was 2-3 months ago.      Review of Systems   Constitutional: Negative for fever.   Gastrointestinal: Positive for nausea. Negative for abdominal pain and vomiting.   Genitourinary: Positive for dysuria, frequency and urgency. Negative for flank pain, hematuria and vaginal discharge.   Musculoskeletal: Negative for back pain.          Physical Exam     No PE performed  Madie was seen today for urinary frequency.    Diagnoses and all orders for this visit:    Suspected UTI  -     nitrofurantoin, macrocrystal-monohydrate, (Macrobid) 100 MG capsule; Take 1 capsule by mouth 2 (Two) Times a Day for 5 days.  -     phenazopyridine (Pyridium) 200 MG tablet; Take 1 tablet by mouth 3 (Three) Times a Day As Needed for Bladder Spasms for up to 2 days.        Any medications prescribed have been sent electronically to   [id number] - [place], [place] - [address] - [phone] PH - [phone] FX  [address] [place] [address]  Phone: [phone] Fax: [phone]    [facility] - [place], [place] - [address] - [phone] PH - [phone] FX  [address] [address]  Phone: [phone] Fax: [phone]    Encourage fluids.  Take medications as directed.  Wear pantyliner while taking pyridium as will cause vaginal secretions to be orange and may  stain your underwear.  Go to the ER if symptoms worsen; vomiting develops, fever develops, abdominal pain or back pain develops.  Follow up with PMD or Urgent Care if symptoms have not improved in 2-3 days.      Evideo visit 20 mins    Remedios Ferris PA-C  06/19/20  3:59 PM

## 2020-06-19 NOTE — PATIENT INSTRUCTIONS
Encourage fluids.  Take medications as directed.  Wear pantyliner while taking pyridium as will cause vaginal secretions to be orange and may stain your underwear.  Go to the ER if symptoms worsen; vomiting develops, fever develops, abdominal pain or back pain develops.  Follow up with PMD or Urgent Care if symptoms have not improved in 2-3 days.      Urinary Tract Infection, Adult  A urinary tract infection (UTI) is an infection of any part of the urinary tract. The urinary tract includes:  · The kidneys.  · The ureters.  · The bladder.  · The urethra.  These organs make, store, and get rid of pee (urine) in the body.  What are the causes?  This is caused by germs (bacteria) in your genital area. These germs grow and cause swelling (inflammation) of your urinary tract.  What increases the risk?  You are more likely to develop this condition if:  · You have a small, thin tube (catheter) to drain pee.  · You cannot control when you pee or poop (incontinence).  · You are female, and:  ? You use these methods to prevent pregnancy:  ? A medicine that kills sperm (spermicide).  ? A device that blocks sperm (diaphragm).  ? You have low levels of a female hormone (estrogen).  ? You are pregnant.  · You have genes that add to your risk.  · You are sexually active.  · You take antibiotic medicines.  · You have trouble peeing because of:  ? A prostate that is bigger than normal, if you are male.  ? A blockage in the part of your body that drains pee from the bladder (urethra).  ? A kidney stone.  ? A nerve condition that affects your bladder (neurogenic bladder).  ? Not getting enough to drink.  ? Not peeing often enough.  · You have other conditions, such as:  ? Diabetes.  ? A weak disease-fighting system (immune system).  ? Sickle cell disease.  ? Gout.  ? Injury of the spine.  What are the signs or symptoms?  Symptoms of this condition include:  · Needing to pee right away (urgently).  · Peeing often.  · Peeing small  amounts often.  · Pain or burning when peeing.  · Blood in the pee.  · Pee that smells bad or not like normal.  · Trouble peeing.  · Pee that is cloudy.  · Fluid coming from the vagina, if you are female.  · Pain in the belly or lower back.  Other symptoms include:  · Throwing up (vomiting).  · No urge to eat.  · Feeling mixed up (confused).  · Being tired and grouchy (irritable).  · A fever.  · Watery poop (diarrhea).  How is this treated?  This condition may be treated with:  · Antibiotic medicine.  · Other medicines.  · Drinking enough water.  Follow these instructions at home:    Medicines  · Take over-the-counter and prescription medicines only as told by your doctor.  · If you were prescribed an antibiotic medicine, take it as told by your doctor. Do not stop taking it even if you start to feel better.  General instructions  · Make sure you:  ? Pee until your bladder is empty.  ? Do not hold pee for a long time.  ? Empty your bladder after sex.  ? Wipe from front to back after pooping if you are a female. Use each tissue one time when you wipe.  · Drink enough fluid to keep your pee pale yellow.  · Keep all follow-up visits as told by your doctor. This is important.  Contact a doctor if:  · You do not get better after 1-2 days.  · Your symptoms go away and then come back.  Get help right away if:  · You have very bad back pain.  · You have very bad pain in your lower belly.  · You have a fever.  · You are sick to your stomach (nauseous).  · You are throwing up.  Summary  · A urinary tract infection (UTI) is an infection of any part of the urinary tract.  · This condition is caused by germs in your genital area.  · There are many risk factors for a UTI. These include having a small, thin tube to drain pee and not being able to control when you pee or poop.  · Treatment includes antibiotic medicines for germs.  · Drink enough fluid to keep your pee pale yellow.  This information is not intended to replace advice  given to you by your health care provider. Make sure you discuss any questions you have with your health care provider.  Document Released: 06/05/2009 Document Revised: 12/05/2019 Document Reviewed: 06/27/2019  Elsevier Patient Education © 2020 [employer]

## 2020-08-18 ENCOUNTER — TELEPHONE (OUTPATIENT)
Dept: FAMILY MEDICINE CLINIC | Facility: CLINIC | Age: 65
End: 2020-08-18

## 2020-08-18 RX ORDER — CIPROFLOXACIN 500 MG/1
500 TABLET, FILM COATED ORAL 2 TIMES DAILY
Qty: 14 TABLET | Refills: 0 | Status: SHIPPED | OUTPATIENT
Start: 2020-08-18 | End: 2020-10-13

## 2020-08-18 NOTE — TELEPHONE ENCOUNTER
The patient believes she has a UTI. She has scheduled an appt for this Thursday (first available appt) but she does not want to wait that long to get started on some medication because it's already been going on for a few days. She would like to know if you would call in an rx for her to get started on today.

## 2020-08-20 ENCOUNTER — LAB (OUTPATIENT)
Dept: LAB | Facility: HOSPITAL | Age: 65
End: 2020-08-20

## 2020-08-20 ENCOUNTER — OFFICE VISIT (OUTPATIENT)
Dept: FAMILY MEDICINE CLINIC | Facility: CLINIC | Age: 65
End: 2020-08-20

## 2020-08-20 VITALS
HEART RATE: 64 BPM | SYSTOLIC BLOOD PRESSURE: 137 MMHG | OXYGEN SATURATION: 97 % | HEIGHT: 64 IN | DIASTOLIC BLOOD PRESSURE: 72 MMHG | TEMPERATURE: 98.2 F | BODY MASS INDEX: 28.07 KG/M2 | WEIGHT: 164.4 LBS | RESPIRATION RATE: 20 BRPM

## 2020-08-20 DIAGNOSIS — N39.0 URINARY TRACT INFECTION WITHOUT HEMATURIA, SITE UNSPECIFIED: ICD-10-CM

## 2020-08-20 DIAGNOSIS — E53.8 B12 DEFICIENCY: ICD-10-CM

## 2020-08-20 DIAGNOSIS — I10 ESSENTIAL HYPERTENSION: Primary | ICD-10-CM

## 2020-08-20 DIAGNOSIS — I10 ESSENTIAL HYPERTENSION: ICD-10-CM

## 2020-08-20 DIAGNOSIS — I25.10 ATHEROSCLEROSIS OF NATIVE CORONARY ARTERY OF NATIVE HEART WITHOUT ANGINA PECTORIS: ICD-10-CM

## 2020-08-20 DIAGNOSIS — R10.31 RIGHT INGUINAL PAIN: ICD-10-CM

## 2020-08-20 DIAGNOSIS — E78.5 HYPERLIPIDEMIA, UNSPECIFIED HYPERLIPIDEMIA TYPE: ICD-10-CM

## 2020-08-20 LAB
ALBUMIN SERPL-MCNC: 4.2 G/DL (ref 3.5–5.2)
ALBUMIN/GLOB SERPL: 1.6 G/DL
ALP SERPL-CCNC: 83 U/L (ref 39–117)
ALT SERPL W P-5'-P-CCNC: 17 U/L (ref 1–33)
ANION GAP SERPL CALCULATED.3IONS-SCNC: 7.6 MMOL/L (ref 5–15)
AST SERPL-CCNC: 17 U/L (ref 1–32)
BASOPHILS # BLD AUTO: 0.06 10*3/MM3 (ref 0–0.2)
BASOPHILS NFR BLD AUTO: 1 % (ref 0–1.5)
BILIRUB SERPL-MCNC: 0.3 MG/DL (ref 0–1.2)
BILIRUB UR QL STRIP: NEGATIVE
BUN SERPL-MCNC: 10 MG/DL (ref 8–23)
BUN/CREAT SERPL: 16.1 (ref 7–25)
CALCIUM SPEC-SCNC: 9.3 MG/DL (ref 8.6–10.5)
CHLORIDE SERPL-SCNC: 104 MMOL/L (ref 98–107)
CHOLEST SERPL-MCNC: 194 MG/DL (ref 0–200)
CLARITY UR: CLEAR
CO2 SERPL-SCNC: 25.4 MMOL/L (ref 22–29)
COLOR UR: YELLOW
CREAT SERPL-MCNC: 0.62 MG/DL (ref 0.57–1)
DEPRECATED RDW RBC AUTO: 40.3 FL (ref 37–54)
EOSINOPHIL # BLD AUTO: 0.09 10*3/MM3 (ref 0–0.4)
EOSINOPHIL NFR BLD AUTO: 1.5 % (ref 0.3–6.2)
ERYTHROCYTE [DISTWIDTH] IN BLOOD BY AUTOMATED COUNT: 12.2 % (ref 12.3–15.4)
GFR SERPL CREATININE-BSD FRML MDRD: 97 ML/MIN/1.73
GLOBULIN UR ELPH-MCNC: 2.7 GM/DL
GLUCOSE SERPL-MCNC: 93 MG/DL (ref 65–99)
GLUCOSE UR STRIP-MCNC: NEGATIVE MG/DL
HCT VFR BLD AUTO: 39.6 % (ref 34–46.6)
HDLC SERPL-MCNC: 59 MG/DL (ref 40–60)
HGB BLD-MCNC: 13.6 G/DL (ref 12–15.9)
HGB UR QL STRIP.AUTO: NEGATIVE
IMM GRANULOCYTES # BLD AUTO: 0.01 10*3/MM3 (ref 0–0.05)
IMM GRANULOCYTES NFR BLD AUTO: 0.2 % (ref 0–0.5)
KETONES UR QL STRIP: NEGATIVE
LDLC SERPL CALC-MCNC: 113 MG/DL (ref 0–100)
LDLC/HDLC SERPL: 1.91 {RATIO}
LEUKOCYTE ESTERASE UR QL STRIP.AUTO: NEGATIVE
LYMPHOCYTES # BLD AUTO: 1.59 10*3/MM3 (ref 0.7–3.1)
LYMPHOCYTES NFR BLD AUTO: 26 % (ref 19.6–45.3)
MCH RBC QN AUTO: 30.8 PG (ref 26.6–33)
MCHC RBC AUTO-ENTMCNC: 34.3 G/DL (ref 31.5–35.7)
MCV RBC AUTO: 89.8 FL (ref 79–97)
MONOCYTES # BLD AUTO: 0.6 10*3/MM3 (ref 0.1–0.9)
MONOCYTES NFR BLD AUTO: 9.8 % (ref 5–12)
NEUTROPHILS NFR BLD AUTO: 3.76 10*3/MM3 (ref 1.7–7)
NEUTROPHILS NFR BLD AUTO: 61.5 % (ref 42.7–76)
NITRITE UR QL STRIP: NEGATIVE
NRBC BLD AUTO-RTO: 0 /100 WBC (ref 0–0.2)
PH UR STRIP.AUTO: 6.5 [PH] (ref 5–8)
PLATELET # BLD AUTO: 313 10*3/MM3 (ref 140–450)
PMV BLD AUTO: 9.8 FL (ref 6–12)
POTASSIUM SERPL-SCNC: 4.2 MMOL/L (ref 3.5–5.2)
PROT SERPL-MCNC: 6.9 G/DL (ref 6–8.5)
PROT UR QL STRIP: NEGATIVE
RBC # BLD AUTO: 4.41 10*6/MM3 (ref 3.77–5.28)
SODIUM SERPL-SCNC: 137 MMOL/L (ref 136–145)
SP GR UR STRIP: 1.01 (ref 1–1.03)
TRIGL SERPL-MCNC: 112 MG/DL (ref 0–150)
UROBILINOGEN UR QL STRIP: NORMAL
VIT B12 BLD-MCNC: 440 PG/ML (ref 211–946)
VLDLC SERPL-MCNC: 22.4 MG/DL (ref 5–40)
WBC # BLD AUTO: 6.11 10*3/MM3 (ref 3.4–10.8)

## 2020-08-20 PROCEDURE — 99214 OFFICE O/P EST MOD 30 MIN: CPT | Performed by: FAMILY MEDICINE

## 2020-08-20 PROCEDURE — 85025 COMPLETE CBC W/AUTO DIFF WBC: CPT

## 2020-08-20 PROCEDURE — 80053 COMPREHEN METABOLIC PANEL: CPT

## 2020-08-20 PROCEDURE — 36415 COLL VENOUS BLD VENIPUNCTURE: CPT

## 2020-08-20 PROCEDURE — 82607 VITAMIN B-12: CPT

## 2020-08-20 PROCEDURE — 80061 LIPID PANEL: CPT

## 2020-08-20 PROCEDURE — 81003 URINALYSIS AUTO W/O SCOPE: CPT

## 2020-08-20 NOTE — PATIENT INSTRUCTIONS
Continue your current medications and treatment.    Have the follow up labs done amnd call for results.    Consider taking additional medications to lower your BP.    Follow up in the office in 3 months.

## 2020-08-20 NOTE — PROGRESS NOTES
"Subjective   Madie Lopez is a 65 y.o. female.     Chief Complaint   Patient presents with   • Dysuria   • Groin Pain       HPI  Chief complaint: Dysuria right groin pain hypertension coronary artery disease hyperlipidemia    The patient is a 65-year-old white female comes in for follow-up and maintenance of her current problems which include    1.  Urinary tract infection-new-patient is currently on Cipro 500 mg twice a day.  This was started by me 1 to 2 days ago because of urinary symptoms.  Patient's had several UTIs in the past several months.  She states that antibiotics helps take care of these.  Patient denied flank pain or fever or chills.  She states she was has dysuria.    2.  Right groin pain-patient states she is developed right groin pain for the past 2 to 3 weeks.  She states she cannot really say if the pain is just funny sensation.  She states nothing makes it better or worse.  She denied weakness in her legs.  She denied tenderness in the right groin area.    3.  Hypertension-deteriorated-patient is on Coreg.  Her blood pressures are high today.  The patient states that she has been off of her routine to control her blood pressure.  She does not want to take additional medications at the present time.  She states she would like to try to get back on her routine.    4.  Coronary artery disease-stable-patient on aspirin 81 mg daily.  She denies chest pain shortness breath orthopnea or PND.    5.  Hyperlipidemia-stable    6.  B12 deficiency-stable-patient is currently on B12 replacement therapy.      The following portions of the patient's history were reviewed and updated as appropriate: allergies, current medications, past family history, past medical history, past social history, past surgical history and problem list.    Review of Systems    Objective     /72   Pulse 64   Temp 98.2 °F (36.8 °C) (Infrared)   Resp 20   Ht 162.6 cm (64\")   Wt 74.6 kg (164 lb 6.4 oz)   SpO2 97%   " BMI 28.22 kg/m²     Physical Exam   Constitutional: She is oriented to person, place, and time. She appears well-developed and well-nourished.   HENT:   Head: Normocephalic and atraumatic.   Eyes: Pupils are equal, round, and reactive to light. Conjunctivae and EOM are normal.   Neck: Normal range of motion. Neck supple.   Cardiovascular: Normal rate, regular rhythm, normal heart sounds and intact distal pulses.   Pulmonary/Chest: Effort normal and breath sounds normal.   Abdominal: Soft. Bowel sounds are normal.   Musculoskeletal: Normal range of motion.   Neurological: She is alert and oriented to person, place, and time.   Skin: Skin is warm and dry.   Psychiatric: She has a normal mood and affect. Her behavior is normal.   Nursing note and vitals reviewed.        Assessment/Plan   Madie was seen today for dysuria and groin pain.    Diagnoses and all orders for this visit:    Essential hypertension-patient's blood pressures not at goal today.  We talked about taking additional medication.  Patient prefers to try to control this with lifestyle modification.  She is to call if her pressure continues to be elevated.  -     CBC & Differential; Future  -     Comprehensive Metabolic Panel; Future    Hyperlipidemia, unspecified hyperlipidemia type  -     Lipid Panel; Future    B12 deficiency  -     Vitamin B12; Future    Urinary tract infection without hematuria, site unspecified  -     Urinalysis With / Culture If Indicated -; Future    Atherosclerosis of native coronary artery of native heart without angina pectoris    Right inguinal pain-exam today feels reveal cause of her groin pain.  She has no pain with internal rotation of either hip.  She does not have a mass in the right groin area.  There is no lymphadenopathy there is no hernia.  Observe this for now.    Patient Instructions   Continue your current medications and treatment.    Have the follow up labs done amnd call for results.    Consider taking  additional medications to lower your BP.    Follow up in the office in 3 months.      Esteban Daily Jr., MD    08/20/20

## 2020-10-13 ENCOUNTER — OFFICE VISIT (OUTPATIENT)
Dept: FAMILY MEDICINE CLINIC | Facility: CLINIC | Age: 65
End: 2020-10-13

## 2020-10-13 VITALS
TEMPERATURE: 96.8 F | BODY MASS INDEX: 26.4 KG/M2 | WEIGHT: 154.6 LBS | HEIGHT: 64 IN | HEART RATE: 73 BPM | SYSTOLIC BLOOD PRESSURE: 137 MMHG | OXYGEN SATURATION: 99 % | RESPIRATION RATE: 20 BRPM | DIASTOLIC BLOOD PRESSURE: 83 MMHG

## 2020-10-13 DIAGNOSIS — I10 ESSENTIAL HYPERTENSION: Primary | ICD-10-CM

## 2020-10-13 DIAGNOSIS — Z23 NEED FOR PROPHYLACTIC VACCINATION AND INOCULATION AGAINST INFLUENZA: ICD-10-CM

## 2020-10-13 DIAGNOSIS — E78.5 HYPERLIPIDEMIA, UNSPECIFIED HYPERLIPIDEMIA TYPE: ICD-10-CM

## 2020-10-13 DIAGNOSIS — D64.9 ANEMIA, UNSPECIFIED TYPE: ICD-10-CM

## 2020-10-13 DIAGNOSIS — E53.8 B12 DEFICIENCY: ICD-10-CM

## 2020-10-13 DIAGNOSIS — I25.10 ATHEROSCLEROSIS OF NATIVE CORONARY ARTERY OF NATIVE HEART WITHOUT ANGINA PECTORIS: ICD-10-CM

## 2020-10-13 PROBLEM — N39.0 UTI (URINARY TRACT INFECTION): Status: RESOLVED | Noted: 2020-08-20 | Resolved: 2020-10-13

## 2020-10-13 PROBLEM — R10.31 RIGHT INGUINAL PAIN: Status: RESOLVED | Noted: 2020-08-20 | Resolved: 2020-10-13

## 2020-10-13 PROCEDURE — 99214 OFFICE O/P EST MOD 30 MIN: CPT | Performed by: FAMILY MEDICINE

## 2020-10-13 PROCEDURE — 90471 IMMUNIZATION ADMIN: CPT | Performed by: FAMILY MEDICINE

## 2020-10-13 PROCEDURE — 90686 IIV4 VACC NO PRSV 0.5 ML IM: CPT | Performed by: FAMILY MEDICINE

## 2020-10-13 RX ORDER — ATORVASTATIN CALCIUM 10 MG/1
10 TABLET, FILM COATED ORAL DAILY
COMMUNITY
End: 2020-12-11 | Stop reason: SDUPTHER

## 2020-10-13 NOTE — PROGRESS NOTES
"Subjective   Madie Lopez is a 65 y.o. female.     Chief Complaint   Patient presents with   • Hypertension     followup   • Coronary Artery Disease       HPI chief complaint: Hypertension hyperlipidemia coronary artery disease B12 deficiency    The patient is a 65-year-old white female comes in for follow-up and maintenance of her current problems which include    1.  Hypertension-stable-patient is currently on Coreg 6.25 mg twice a day.  She denied headache lightheadedness dizziness or chest pain.  Blood pressure when she first came in today was elevated.  When I repeated it was normal.  She is can monitor pressures and call if blood pressures are consistently above 140/90.    2.  Hyperlipidemia-deteriorated-the patient is not currently taking a statin drug.  Patient has been on a statin and her LDL cholesterol and total cholesterol was low.  This was stopped.  Now her LDL cholesterol is 113.  I recommended starting like Lipitor 10 mg daily.    3.  Coronary artery disease-stable-patient is on aspirin and Coreg.  She denies chest pain shortness breath orthopnea or PND.    4.  B12 deficiency/anemia-stable-patient on B12 replacement therapy.     5.  Allergic rhinitis-stable-patient is currently on Flonase or Nasacort.          The following portions of the patient's history were reviewed and updated as appropriate: allergies, current medications, past family history, past medical history, past social history, past surgical history and problem list.    Review of Systems    Objective     /83   Pulse 73   Temp 96.8 °F (36 °C) (Temporal)   Resp 20   Ht 162.6 cm (64\")   Wt 70.1 kg (154 lb 9.6 oz)   SpO2 99%   BMI 26.54 kg/m²     Physical Exam  Vitals signs and nursing note reviewed.   Constitutional:       Appearance: She is well-developed.   HENT:      Head: Normocephalic and atraumatic.      Nose: Nose normal.      Mouth/Throat:      Mouth: Mucous membranes are moist.      Pharynx: Oropharynx is " clear.   Eyes:      Extraocular Movements: Extraocular movements intact.      Conjunctiva/sclera: Conjunctivae normal.      Pupils: Pupils are equal, round, and reactive to light.   Neck:      Musculoskeletal: Neck supple.   Cardiovascular:      Rate and Rhythm: Normal rate and regular rhythm.      Pulses: Normal pulses.      Heart sounds: Normal heart sounds.   Pulmonary:      Effort: Pulmonary effort is normal.      Breath sounds: Normal breath sounds.   Abdominal:      General: Abdomen is flat. Bowel sounds are normal.      Palpations: Abdomen is soft.   Musculoskeletal: Normal range of motion.   Skin:     General: Skin is warm and dry.   Neurological:      Mental Status: She is oriented to person, place, and time.   Psychiatric:         Behavior: Behavior normal.         Thought Content: Thought content normal.         Judgment: Judgment normal.           Assessment/Plan   Madie was seen today for hypertension and coronary artery disease.    Diagnoses and all orders for this visit:    Essential hypertension    Hyperlipidemia, unspecified hyperlipidemia type    Atherosclerosis of native coronary artery of native heart without angina pectoris    Anemia, unspecified type    B12 deficiency      Patient Instructions   Continue your current medications and treatment.    Start taking the lipitor once again.    Monitor your blood pressure at home and call if it is consistently above 140/90.    Follow up in the office in 6 months,      Esteban Daily Jr., MD    10/13/20

## 2020-10-13 NOTE — PATIENT INSTRUCTIONS
Continue your current medications and treatment.    Start taking the lipitor once again.    Monitor your blood pressure at home and call if it is consistently above 140/90.    Follow up in the office in 6 months,

## 2020-12-11 RX ORDER — ATORVASTATIN CALCIUM 10 MG/1
10 TABLET, FILM COATED ORAL DAILY
Qty: 30 TABLET | Refills: 0 | Status: SHIPPED | OUTPATIENT
Start: 2020-12-11 | End: 2021-05-04 | Stop reason: SDUPTHER

## 2020-12-11 RX ORDER — CARVEDILOL 6.25 MG/1
6.25 TABLET ORAL 2 TIMES DAILY
Qty: 60 TABLET | Refills: 0 | Status: SHIPPED | OUTPATIENT
Start: 2020-12-11 | End: 2021-02-15 | Stop reason: SDUPTHER

## 2021-02-15 RX ORDER — CARVEDILOL 6.25 MG/1
TABLET ORAL
Qty: 180 TABLET | OUTPATIENT
Start: 2021-02-15

## 2021-02-15 RX ORDER — CARVEDILOL 6.25 MG/1
6.25 TABLET ORAL 2 TIMES DAILY
Qty: 60 TABLET | Refills: 0 | Status: SHIPPED | OUTPATIENT
Start: 2021-02-15 | End: 2021-03-15

## 2021-02-15 NOTE — TELEPHONE ENCOUNTER
NEEDS REFILL ON CARVEDILOL. HAS APT 2/18, BUT DUE TO WEATHER SHE MAY NOT BE ABLE TO MAKE. WORRIED SHE WILL NOT GET REFILLS.

## 2021-02-19 RX ORDER — CYANOCOBALAMIN 1000 UG/ML
1000 INJECTION, SOLUTION INTRAMUSCULAR; SUBCUTANEOUS
Qty: 10 ML | Refills: 0 | Status: SHIPPED | OUTPATIENT
Start: 2021-02-19 | End: 2021-07-19 | Stop reason: SDUPTHER

## 2021-03-15 RX ORDER — CARVEDILOL 6.25 MG/1
TABLET ORAL
Qty: 60 TABLET | Refills: 3 | Status: SHIPPED | OUTPATIENT
Start: 2021-03-15 | End: 2021-07-12

## 2021-03-25 ENCOUNTER — OFFICE VISIT (OUTPATIENT)
Dept: FAMILY MEDICINE CLINIC | Facility: CLINIC | Age: 66
End: 2021-03-25

## 2021-03-25 VITALS
OXYGEN SATURATION: 100 % | HEART RATE: 57 BPM | DIASTOLIC BLOOD PRESSURE: 90 MMHG | RESPIRATION RATE: 16 BRPM | WEIGHT: 156.2 LBS | BODY MASS INDEX: 26.67 KG/M2 | TEMPERATURE: 97.1 F | HEIGHT: 64 IN | SYSTOLIC BLOOD PRESSURE: 130 MMHG

## 2021-03-25 DIAGNOSIS — L98.491 SKIN ULCER, LIMITED TO BREAKDOWN OF SKIN (HCC): Primary | ICD-10-CM

## 2021-03-25 DIAGNOSIS — M19.042 OSTEOARTHRITIS OF BOTH HANDS, UNSPECIFIED OSTEOARTHRITIS TYPE: Chronic | ICD-10-CM

## 2021-03-25 DIAGNOSIS — M19.041 OSTEOARTHRITIS OF BOTH HANDS, UNSPECIFIED OSTEOARTHRITIS TYPE: Chronic | ICD-10-CM

## 2021-03-25 PROCEDURE — 99213 OFFICE O/P EST LOW 20 MIN: CPT | Performed by: NURSE PRACTITIONER

## 2021-03-25 NOTE — PROGRESS NOTES
"Subjective   {CC  Problem List  Visit Diagnosis   Encounters  Notes  Medications  Labs  Result Review Imaging  Media :23}     Madie Lopez is a 65 y.o. female.     Chief Complaint   Patient presents with   • Rash     Lesion/ Rash above sacral region. Sacrum/ Coccyx pain        History of Present Illness   Patient is here for multiple concerns;  She has nodules on hands and is concerned for RA. She has talked with Dr Daily in the past and they discussed osteoarthritis.   Limits use of thumbs has decreased ROM. Limited ability to .    Patient is here for evaluation of a rash on her sacrum and coccyx she has had this since February . She was seen by gyn and prescribed nysatin. The gyn told her she felt was fungal.  She has pictures from feb and today that we could compare severity.   She noticed a rash on her right foot first 3 toes prior to rash on buttocks.   The rash on foot went away described as itched most at night and foot was red up to the lower part of foot.         The following portions of the patient's history were reviewed and updated as appropriate: allergies, current medications, past family history, past medical history, past social history, past surgical history and problem list.      Current Outpatient Medications:   •  aspirin (ASPIR-LOW) 81 MG EC tablet, ASPIR-LOW 81 MG TBEC, Disp: , Rfl:   •  atorvastatin (LIPITOR) 10 MG tablet, Take 1 tablet by mouth Daily. PT NEEDS AN APPOINTMENT FOR FURTHER REFILLS, Disp: 30 tablet, Rfl: 0  •  B-D 3CC LUER-KATHIE SYR 23GX1\" 23G X 1\" 3 ML misc, USE FOR B 12 INJECTIONS, Disp: , Rfl: 0  •  carvedilol (COREG) 6.25 MG tablet, TAKE 1 TABLET BY MOUTH TWICE DAILY, Disp: 60 tablet, Rfl: 3  •  cyanocobalamin 1000 MCG/ML injection, Inject 1 mL into the appropriate muscle as directed by prescriber Every 30 (Thirty) Days., Disp: 10 mL, Rfl: 0  •  Syringe 23G X 1\" 3 ML misc, SYRINGE 23G X 1\" 3 ML, Disp: , Rfl:   •  Triamcinolone Acetonide (NASACORT ALLERGY " "24HR) 55 MCG/ACT nasal inhaler, NASACORT ALLERGY 24HR 55 MCG/ACT AERO, Disp: , Rfl:     No results found for this or any previous visit (from the past 4032 hour(s)).      Review of Systems    Objective     /90   Pulse 57   Temp 97.1 °F (36.2 °C) (Infrared)   Resp 16   Ht 162.6 cm (64\")   Wt 70.9 kg (156 lb 3.2 oz)   SpO2 100%   BMI 26.81 kg/m²     Physical Exam  Vitals and nursing note reviewed.   HENT:      Head: Normocephalic and atraumatic.      Nose: Nose normal.      Mouth/Throat:      Mouth: Mucous membranes are moist.   Cardiovascular:      Pulses: Normal pulses.      Heart sounds: Normal heart sounds.   Pulmonary:      Effort: Pulmonary effort is normal.      Breath sounds: Normal breath sounds.   Abdominal:      General: Abdomen is flat.      Palpations: Abdomen is soft.   Musculoskeletal:         General: Normal range of motion.      Right hand: Normal strength.      Left hand: Normal strength.   Skin:     General: Skin is warm and dry.             Comments: Skin tear distal coccyx  . No papules no vesicles.      Neurological:      General: No focal deficit present.      Mental Status: She is alert.   Psychiatric:         Mood and Affect: Mood normal.         Behavior: Behavior normal.         Thought Content: Thought content normal.         Judgment: Judgment normal.         Result Review :                Assessment/Plan    Diagnoses and all orders for this visit:    1. Skin ulcer, limited to breakdown of skin (CMS/HCC) (Primary)  Comments:  calmoseptine prescribed.     2. Osteoarthritis of both hands, unspecified osteoarthritis type  Comments:  discussed nsaids daily  exrercises and strenghening. massage. other options for testing if she wants to pursue      Patient Instructions   Tummeric, glucosamine chondrotin. Yoga exercise hands .  Gripping activities.   Use the calmoseptine cream as barrier let me know If this helps keep clean and dry.   Call if worsens.   Stop nystatin      Follow Up "   No follow-ups on file.    Patient was given instructions and counseling regarding her condition or for health maintenance advice. Please see specific information pulled into the AVS if appropriate.     Chantell Church, APRN    03/25/21      Answers for HPI/ROS submitted by the patient on 3/25/2021  Please describe your symptoms.: Tailbone area discomfort, Skin irritation  Have you had these symptoms before?: No  How long have you been having these symptoms?: Greater than 2 weeks  Please list any medications you are currently taking for this condition.: Bringing box with me-nystatin cream  What is the primary reason for your visit?: Other

## 2021-03-25 NOTE — PATIENT INSTRUCTIONS
Tummeric, glucosamine chondrotin. Yoga exercise hands .  Gripping activities.   Use the calmoseptine cream as barrier let me know If this helps keep clean and dry.   Call if worsens.   Stop nystatin

## 2021-04-02 RX ORDER — NYSTATIN AND TRIAMCINOLONE ACETONIDE 100000; 1 [USP'U]/G; MG/G
OINTMENT TOPICAL
COMMUNITY
Start: 2021-03-11 | End: 2021-06-10

## 2021-05-04 RX ORDER — ATORVASTATIN CALCIUM 10 MG/1
10 TABLET, FILM COATED ORAL DAILY
Qty: 90 TABLET | Refills: 0 | Status: SHIPPED | OUTPATIENT
Start: 2021-05-04 | End: 2021-07-19

## 2021-06-01 ENCOUNTER — HOSPITAL ENCOUNTER (OUTPATIENT)
Dept: GENERAL RADIOLOGY | Facility: HOSPITAL | Age: 66
Discharge: HOME | End: 2021-06-01

## 2021-06-01 ENCOUNTER — OFFICE VISIT (OUTPATIENT)
Dept: FAMILY MEDICINE CLINIC | Facility: CLINIC | Age: 66
End: 2021-06-01

## 2021-06-01 ENCOUNTER — LAB (OUTPATIENT)
Dept: LAB | Facility: HOSPITAL | Age: 66
End: 2021-06-01

## 2021-06-01 VITALS
SYSTOLIC BLOOD PRESSURE: 150 MMHG | HEIGHT: 64 IN | BODY MASS INDEX: 28.05 KG/M2 | TEMPERATURE: 96.9 F | DIASTOLIC BLOOD PRESSURE: 88 MMHG | RESPIRATION RATE: 16 BRPM | WEIGHT: 164.3 LBS | OXYGEN SATURATION: 99 % | HEART RATE: 60 BPM

## 2021-06-01 DIAGNOSIS — S80.862A TICK BITE OF CALF, LEFT, INITIAL ENCOUNTER: ICD-10-CM

## 2021-06-01 DIAGNOSIS — M85.612 CYST OF LEFT CLAVICLE: ICD-10-CM

## 2021-06-01 DIAGNOSIS — M85.612 CYST OF LEFT CLAVICLE: Primary | ICD-10-CM
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
## 2021-06-01 LAB
BASOPHILS # BLD AUTO: 0.04 10*3/MM3 (ref 0–0.2)
BASOPHILS NFR BLD AUTO: 0.5 % (ref 0–1.5)
DEPRECATED RDW RBC AUTO: 45.6 FL (ref 37–54)
EOSINOPHIL # BLD AUTO: 0.05 10*3/MM3 (ref 0–0.4)
EOSINOPHIL NFR BLD AUTO: 0.6 % (ref 0.3–6.2)
ERYTHROCYTE [DISTWIDTH] IN BLOOD BY AUTOMATED COUNT: 12.7 % (ref 12.3–15.4)
HCT VFR BLD AUTO: 44.4 % (ref 34–46.6)
HGB BLD-MCNC: 14.5 G/DL (ref 12–15.9)
IMM GRANULOCYTES # BLD AUTO: 0.02 10*3/MM3 (ref 0–0.05)
IMM GRANULOCYTES NFR BLD AUTO: 0.2 % (ref 0–0.5)
LYMPHOCYTES # BLD AUTO: 1.9 10*3/MM3 (ref 0.7–3.1)
LYMPHOCYTES NFR BLD AUTO: 23.1 % (ref 19.6–45.3)
MCH RBC QN AUTO: 31.5 PG (ref 26.6–33)
MCHC RBC AUTO-ENTMCNC: 32.7 G/DL (ref 31.5–35.7)
MCV RBC AUTO: 96.3 FL (ref 79–97)
MONOCYTES # BLD AUTO: 0.51 10*3/MM3 (ref 0.1–0.9)
MONOCYTES NFR BLD AUTO: 6.2 % (ref 5–12)
NEUTROPHILS NFR BLD AUTO: 5.69 10*3/MM3 (ref 1.7–7)
NEUTROPHILS NFR BLD AUTO: 69.4 % (ref 42.7–76)
NRBC BLD AUTO-RTO: 0 /100 WBC (ref 0–0.2)
PLATELET # BLD AUTO: 328 10*3/MM3 (ref 140–450)
PMV BLD AUTO: 9.2 FL (ref 6–12)
RBC # BLD AUTO: 4.61 10*6/MM3 (ref 3.77–5.28)
WBC # BLD AUTO: 8.21 10*3/MM3 (ref 3.4–10.8)

## 2021-06-01 PROCEDURE — 73000 X-RAY EXAM OF COLLAR BONE: CPT

## 2021-06-01 PROCEDURE — 99214 OFFICE O/P EST MOD 30 MIN: CPT | Performed by: NURSE PRACTITIONER

## 2021-06-01 PROCEDURE — 86666 EHRLICHIA ANTIBODY: CPT | Performed by: NURSE PRACTITIONER

## 2021-06-01 PROCEDURE — 85025 COMPLETE CBC W/AUTO DIFF WBC: CPT

## 2021-06-01 PROCEDURE — 86757 RICKETTSIA ANTIBODY: CPT | Performed by: NURSE PRACTITIONER

## 2021-06-01 PROCEDURE — 86622 BRUCELLA ANTIBODY: CPT | Performed by: NURSE PRACTITIONER

## 2021-06-01 PROCEDURE — 36415 COLL VENOUS BLD VENIPUNCTURE: CPT

## 2021-06-01 PROCEDURE — 86618 LYME DISEASE ANTIBODY: CPT | Performed by: NURSE PRACTITIONER

## 2021-06-01 NOTE — PROGRESS NOTES
"Subjective        Madie Lopez is a 65 y.o. female.     Chief Complaint   Patient presents with   • Nodule on Clavical     Left . Not painful        History of Present Illness  Patient is here because she found an nonpainful lump on her left clavicle she noticed on Friday.   No fevers . No injury to clavicle. Has had sore throat she was at urgent care few weeks ago was told she has allergies not strep. She had tic bite few months ago on back of left leg nothing recent.   No insect bites no wound left arm.       The following portions of the patient's history were reviewed and updated as appropriate: allergies, current medications, past family history, past medical history, past social history, past surgical history and problem list.      Current Outpatient Medications:   •  aspirin (ASPIR-LOW) 81 MG EC tablet, ASPIR-LOW 81 MG TBEC, Disp: , Rfl:   •  atorvastatin (LIPITOR) 10 MG tablet, Take 1 tablet by mouth Daily. PT NEEDS AN APPOINTMENT FOR FURTHER REFILLS, Disp: 90 tablet, Rfl: 0  •  B-D 3CC LUER-KATHIE SYR 23GX1\" 23G X 1\" 3 ML misc, USE FOR B 12 INJECTIONS, Disp: , Rfl: 0  •  carvedilol (COREG) 6.25 MG tablet, TAKE 1 TABLET BY MOUTH TWICE DAILY, Disp: 60 tablet, Rfl: 3  •  cyanocobalamin 1000 MCG/ML injection, Inject 1 mL into the appropriate muscle as directed by prescriber Every 30 (Thirty) Days., Disp: 10 mL, Rfl: 0  •  Triamcinolone Acetonide (NASACORT ALLERGY 24HR) 55 MCG/ACT nasal inhaler, NASACORT ALLERGY 24HR 55 MCG/ACT AERO, Disp: , Rfl:   •  nystatin-triamcinolone (MYCOLOG) 777988-4.1 UNIT/GM-% ointment, APPLY SPARINGLY EXTERNALLY TO THE AFFECTED AREA TWICE DAILY, Disp: , Rfl:   •  Syringe 23G X 1\" 3 ML misc, SYRINGE 23G X 1\" 3 ML, Disp: , Rfl:     No results found for this or any previous visit (from the past 4032 hour(s)).      Review of Systems   Constitutional: Negative for fever.   Hematological: Positive for adenopathy.        Swelling on left clavicle       Objective     /88 (BP " "Location: Left arm, Patient Position: Sitting, Cuff Size: Adult)   Pulse 60   Temp 96.9 °F (36.1 °C) (Infrared)   Resp 16   Ht 162.6 cm (64\")   Wt 74.5 kg (164 lb 4.8 oz)   SpO2 99%   BMI 28.20 kg/m²     Physical Exam  Vitals and nursing note reviewed.   Constitutional:       Appearance: She is not ill-appearing.   HENT:      Head: Normocephalic.      Right Ear: Hearing, ear canal and external ear normal. Tympanic membrane is perforated.      Left Ear: Hearing, ear canal and external ear normal. A middle ear effusion is present.      Nose: Nose normal.      Mouth/Throat:      Lips: Pink.      Mouth: Mucous membranes are moist. No injury.      Dentition: Normal dentition.      Tongue: No lesions.      Palate: No mass.      Pharynx: Oropharynx is clear.      Tonsils: No tonsillar exudate.   Eyes:      Pupils: Pupils are equal, round, and reactive to light.   Neck:      Vascular: No carotid bruit.   Cardiovascular:      Rate and Rhythm: Normal rate and regular rhythm.      Pulses: Normal pulses.      Heart sounds: Normal heart sounds. No murmur heard.     Pulmonary:      Effort: Pulmonary effort is normal.      Breath sounds: Normal breath sounds. No wheezing.   Abdominal:      Palpations: Abdomen is soft.   Musculoskeletal:         General: Normal range of motion.      Cervical back: Normal range of motion and neck supple. No rigidity.   Skin:     General: Skin is warm and dry.      Capillary Refill: Capillary refill takes less than 2 seconds.             Comments: Area without redness or swelling, no wounds or open areas noted to the skin    Neurological:      General: No focal deficit present.      Mental Status: She is alert. Mental status is at baseline.      Motor: No weakness.   Psychiatric:         Behavior: Behavior normal.         Thought Content: Thought content normal.         Judgment: Judgment normal.         Result Review :                Assessment/Plan    Diagnoses and all orders for this " visit:    1. Cyst of left clavicle (Primary)  Comments:  X ray ordered and labs ordered today   Orders:  -     XR Clavicle Bilateral; Future  -     CBC & Differential; Future  -     Tick Panel - Blood, Arm, Right    2. Tick bite of calf, left, initial encounter  Comments:  labs ordered today       Patient Instructions   Follow up on labs and xray results       Follow Up   Return for lab follow up.    Patient was given instructions and counseling regarding her condition or for health maintenance advice. Please see specific information pulled into the AVS if appropriate.     Chantell Church, APRN    06/01/21

## 2021-06-02 LAB — B BURGDOR IGG SER QL: NEGATIVE

## 2021-06-03 LAB
A PHAGOCYTOPH IGG TITR SER IF: NEGATIVE {TITER}
A PHAGOCYTOPH IGM TITR SER IF: NEGATIVE {TITER}
BRUCELLA IGG SER QL IA: NEGATIVE
BRUCELLA IGM SER QL IA: NEGATIVE
E CHAFFEENSIS IGG TITR SER IF: NEGATIVE {TITER}
E CHAFFEENSIS IGM TITR SER IF: NEGATIVE {TITER}
RICK SF IGG TITR SER IF: NORMAL {TITER}
RICK SF IGM TITR SER IF: NORMAL {TITER}
RICK TYPHUS IGG TITR SER IF: NORMAL {TITER}
RICK TYPHUS IGM TITR SER IF: NORMAL {TITER}

## 2021-06-04 RX ORDER — FLUTICASONE PROPIONATE 50 MCG
SPRAY, SUSPENSION (ML) NASAL
COMMUNITY
Start: 2021-05-18

## 2021-06-10 ENCOUNTER — OFFICE VISIT (OUTPATIENT)
Dept: CARDIOLOGY | Facility: CLINIC | Age: 66
End: 2021-06-10

## 2021-06-10 VITALS
OXYGEN SATURATION: 99 % | HEART RATE: 57 BPM | SYSTOLIC BLOOD PRESSURE: 134 MMHG | HEIGHT: 64 IN | BODY MASS INDEX: 28 KG/M2 | WEIGHT: 164 LBS | DIASTOLIC BLOOD PRESSURE: 91 MMHG

## 2021-06-10 DIAGNOSIS — E78.00 PURE HYPERCHOLESTEROLEMIA: ICD-10-CM

## 2021-06-10 DIAGNOSIS — I25.118 CORONARY ARTERY DISEASE OF NATIVE ARTERY OF NATIVE HEART WITH STABLE ANGINA PECTORIS (HCC): Primary | ICD-10-CM

## 2021-06-10 DIAGNOSIS — I10 ESSENTIAL HYPERTENSION: ICD-10-CM

## 2021-06-10 PROCEDURE — 99213 OFFICE O/P EST LOW 20 MIN: CPT | Performed by: INTERNAL MEDICINE

## 2021-06-10 NOTE — PROGRESS NOTES
"    Subjective:     Encounter Date:06/10/2021      Patient ID: Madie Lopez is a 65 y.o. female.    Chief Complaint:  History of Present Illness  65-year-old white female with history of coronary disease hypertension hyperlipidemia brought to my office for follow-up.  Patient is currently stable without any symptoms of chest pain or shortness of breath at rest on exertion.  No complaints any PND orthopnea.  No palpitation dizziness syncope or swelling of the feet.  Patient has been taking all the medicines regularly.  Patient does not smoke.  Patient is doing exercise regularly.  Follows a good diet.    The following portions of the patient's history were reviewed and updated as appropriate: allergies, current medications, past family history, past medical history, past social history, past surgical history and problem list.  Past Medical History:   Diagnosis Date   • Allergic    • CAD (coronary artery disease)    • Hyperlipidemia    • Hypertension      Past Surgical History:   Procedure Laterality Date   • CATARACT EXTRACTION WITH INTRAOCULAR LENS IMPLANT     •  SECTION     • TOTAL KNEE ARTHROPLASTY       /91 (BP Location: Left arm, Patient Position: Sitting)   Pulse 57   Ht 162.6 cm (64\")   Wt 74.4 kg (164 lb)   SpO2 99%   BMI 28.15 kg/m²   Family History   Problem Relation Age of Onset   • Hypertension Mother    • Heart disease Mother    • Heart disease Father    • Diabetes Father    • Arthritis Father        Current Outpatient Medications:   •  aspirin (ASPIR-LOW) 81 MG EC tablet, ASPIR-LOW 81 MG TBEC, Disp: , Rfl:   •  atorvastatin (LIPITOR) 10 MG tablet, Take 1 tablet by mouth Daily. PT NEEDS AN APPOINTMENT FOR FURTHER REFILLS (Patient taking differently: Take 10 mg by mouth Daily. TAKES 1/2 TABLET DAILY), Disp: 90 tablet, Rfl: 0  •  B-D 3CC LUER-KATHIE SYR 23GX1\" 23G X 1\" 3 ML misc, USE FOR B 12 INJECTIONS, Disp: , Rfl: 0  •  carvedilol (COREG) 6.25 MG tablet, TAKE 1 TABLET BY " "MOUTH TWICE DAILY (Patient taking differently: TAKES 1/2 TABLET TWICE DAILY), Disp: 60 tablet, Rfl: 3  •  cyanocobalamin 1000 MCG/ML injection, Inject 1 mL into the appropriate muscle as directed by prescriber Every 30 (Thirty) Days., Disp: 10 mL, Rfl: 0  •  fluticasone (FLONASE) 50 MCG/ACT nasal spray, SHAKE LIQUID AND USE 1 SPRAY IN EACH NOSTRIL EVERY DAY, Disp: , Rfl:   •  Syringe 23G X 1\" 3 ML misc, SYRINGE 23G X 1\" 3 ML, Disp: , Rfl:   Allergies   Allergen Reactions   • Sulfa Antibiotics Rash     Social History     Socioeconomic History   • Marital status:      Spouse name: Not on file   • Number of children: Not on file   • Years of education: Not on file   • Highest education level: Not on file   Tobacco Use   • Smoking status: Never Smoker   • Smokeless tobacco: Never Used   Vaping Use   • Vaping Use: Never used   Substance and Sexual Activity   • Alcohol use: No   • Drug use: No   • Sexual activity: Defer     Review of Systems   Constitutional: Positive for malaise/fatigue. Negative for fever.   Cardiovascular: Negative for chest pain, dyspnea on exertion and palpitations.   Respiratory: Negative for cough and shortness of breath.    Skin: Negative for rash.   Gastrointestinal: Negative for abdominal pain, nausea and vomiting.   Neurological: Negative for focal weakness and headaches.   All other systems reviewed and are negative.             Objective:     Constitutional:       Appearance: Well-developed.   Eyes:      General: No scleral icterus.     Conjunctiva/sclera: Conjunctivae normal.   HENT:      Head: Normocephalic and atraumatic.   Neck:      Vascular: No carotid bruit or JVD.   Pulmonary:      Effort: Pulmonary effort is normal.      Breath sounds: Normal breath sounds. No wheezing. No rales.   Cardiovascular:      Normal rate. Regular rhythm.   Pulses:     Intact distal pulses.   Abdominal:      General: Bowel sounds are normal.      Palpations: Abdomen is soft.   Musculoskeletal:      " Cervical back: Normal range of motion and neck supple. Skin:     General: Skin is warm and dry.      Findings: No rash.   Neurological:      Mental Status: Alert.       Procedures    Lab Review:           MDM  1.  Coronary disease  Patient has nonobstructive disease with normal be systolic function is currently stable on medications  2.  Hypertension  Patient blood pressure currently stable on medical therapy  3.  Hyperlipidemia  Patient's lipid levels are followed by the primary care doctor.

## 2021-07-12 RX ORDER — CARVEDILOL 6.25 MG/1
TABLET ORAL
Qty: 180 TABLET | Refills: 3 | Status: SHIPPED | OUTPATIENT
Start: 2021-07-12 | End: 2022-08-29

## 2021-07-19 RX ORDER — ATORVASTATIN CALCIUM 10 MG/1
TABLET, FILM COATED ORAL
Qty: 90 TABLET | Refills: 3 | Status: SHIPPED | OUTPATIENT
Start: 2021-07-19 | End: 2022-08-29

## 2021-07-19 RX ORDER — CYANOCOBALAMIN 1000 UG/ML
1000 INJECTION, SOLUTION INTRAMUSCULAR; SUBCUTANEOUS
Qty: 10 ML | Refills: 0 | Status: SHIPPED | OUTPATIENT
Start: 2021-07-19 | End: 2022-06-13 | Stop reason: SDUPTHER

## 2022-01-05 ENCOUNTER — TELEPHONE (OUTPATIENT)
Dept: FAMILY MEDICINE CLINIC | Facility: CLINIC | Age: 67
End: 2022-01-05

## 2022-01-05 ENCOUNTER — LAB (OUTPATIENT)
Dept: LAB | Facility: HOSPITAL | Age: 67
End: 2022-01-05

## 2022-01-05 DIAGNOSIS — J22 ACUTE RESPIRATORY INFECTION: ICD-10-CM

## 2022-01-05 DIAGNOSIS — J22 ACUTE RESPIRATORY INFECTION: Primary | ICD-10-CM

## 2022-01-05 PROCEDURE — U0004 COV-19 TEST NON-CDC HGH THRU: HCPCS

## 2022-01-05 PROCEDURE — C9803 HOPD COVID-19 SPEC COLLECT: HCPCS

## 2022-01-05 NOTE — TELEPHONE ENCOUNTER
I spoke with the patient.  She would like to be tested for Covid.  She was advised to quarantine until we know for sure whether she has Covid.  Further care will depend on results of the study and how she progresses.

## 2022-01-05 NOTE — TELEPHONE ENCOUNTER
PATIENT CALLED STATING THAT HER  TESTED POSITIVE 2 DAYS AGO    SHE HAS SINUS HEADACHE, SCRATCHY THROAT, BUT NO OTHER SYMPTOMS,    SHE IS WANTING TO KNOW YOUR ADVICE ON GETTING TESTED?      PLEASE ADVISE   Madie Lopez (Self) [phone] (H)       SHOULD SHE GO AHEAD AND SCHEDULE WITH OFFICE?     PATIENT IS INTERESTED IN THE INFUSION IF SHE IS POSITIVE    SOME LOCATIONS ARE BOOKED UP AT THE MOMENT

## 2022-01-06 LAB — SARS-COV-2 ORF1AB RESP QL NAA+PROBE: DETECTED

## 2022-01-07 PROBLEM — U07.1 CLINICAL DIAGNOSIS OF COVID-19: Status: ACTIVE | Noted: 2022-01-07

## 2022-01-07 RX ORDER — SODIUM CHLORIDE 9 MG/ML
30 INJECTION, SOLUTION INTRAVENOUS ONCE
Status: CANCELLED | OUTPATIENT
Start: 2022-01-08

## 2022-01-07 RX ORDER — DIPHENHYDRAMINE HYDROCHLORIDE 50 MG/ML
50 INJECTION INTRAMUSCULAR; INTRAVENOUS ONCE AS NEEDED
Status: CANCELLED | OUTPATIENT
Start: 2022-01-08

## 2022-01-07 RX ORDER — EPINEPHRINE 1 MG/ML
0.3 INJECTION, SOLUTION INTRAMUSCULAR; SUBCUTANEOUS AS NEEDED
Status: CANCELLED | OUTPATIENT
Start: 2022-01-08

## 2022-01-07 RX ORDER — METHYLPREDNISOLONE SODIUM SUCCINATE 125 MG/2ML
125 INJECTION, POWDER, LYOPHILIZED, FOR SOLUTION INTRAMUSCULAR; INTRAVENOUS AS NEEDED
Status: CANCELLED | OUTPATIENT
Start: 2022-01-08

## 2022-01-07 RX ORDER — DIPHENHYDRAMINE HCL 25 MG
50 TABLET ORAL ONCE AS NEEDED
Status: CANCELLED | OUTPATIENT
Start: 2022-01-08

## 2022-01-08 ENCOUNTER — HOSPITAL ENCOUNTER (OUTPATIENT)
Dept: INFUSION THERAPY | Facility: HOSPITAL | Age: 67
Discharge: HOME | End: 2022-01-08
Admitting: FAMILY MEDICINE

## 2022-01-08 VITALS
BODY MASS INDEX: 31.01 KG/M2 | HEART RATE: 69 BPM | SYSTOLIC BLOOD PRESSURE: 124 MMHG | HEIGHT: 63 IN | RESPIRATION RATE: 20 BRPM | TEMPERATURE: 99.5 F | WEIGHT: 175 LBS | DIASTOLIC BLOOD PRESSURE: 81 MMHG | OXYGEN SATURATION: 99 %

## 2022-01-08 DIAGNOSIS — U07.1 CLINICAL DIAGNOSIS OF COVID-19: Primary | ICD-10-CM

## 2022-01-08 PROCEDURE — 96365 THER/PROPH/DIAG IV INF INIT: CPT

## 2022-01-08 PROCEDURE — M0247 HC INTRAVENOUS INFUSION SOTROVIMAB: HCPCS | Performed by: FAMILY MEDICINE

## 2022-01-08 PROCEDURE — 96361 HYDRATE IV INFUSION ADD-ON: CPT

## 2022-01-08 PROCEDURE — 25010000002 SOTROVIMAB 500 MG/8ML SOLUTION: Performed by: FAMILY MEDICINE

## 2022-01-08 RX ORDER — SODIUM CHLORIDE 9 MG/ML
30 INJECTION, SOLUTION INTRAVENOUS ONCE
Status: CANCELLED | OUTPATIENT
Start: 2022-01-08

## 2022-01-08 RX ORDER — SODIUM CHLORIDE 9 MG/ML
30 INJECTION, SOLUTION INTRAVENOUS ONCE
Status: COMPLETED | OUTPATIENT
Start: 2022-01-08 | End: 2022-01-08

## 2022-01-08 RX ORDER — DIPHENHYDRAMINE HCL 25 MG
50 CAPSULE ORAL ONCE AS NEEDED
OUTPATIENT
Start: 2022-01-08

## 2022-01-08 RX ORDER — EPINEPHRINE 1 MG/ML
0.3 INJECTION, SOLUTION, CONCENTRATE INTRAVENOUS AS NEEDED
OUTPATIENT
Start: 2022-01-08

## 2022-01-08 RX ORDER — METHYLPREDNISOLONE SODIUM SUCCINATE 125 MG/2ML
125 INJECTION, POWDER, LYOPHILIZED, FOR SOLUTION INTRAMUSCULAR; INTRAVENOUS AS NEEDED
OUTPATIENT
Start: 2022-01-08

## 2022-01-08 RX ORDER — DIPHENHYDRAMINE HYDROCHLORIDE 50 MG/ML
50 INJECTION INTRAMUSCULAR; INTRAVENOUS ONCE AS NEEDED
OUTPATIENT
Start: 2022-01-08

## 2022-01-08 RX ADMIN — SODIUM CHLORIDE 500 MG: 9 INJECTION, SOLUTION INTRAVENOUS at 08:43

## 2022-01-08 RX ADMIN — SODIUM CHLORIDE 30 ML/HR: 9 INJECTION, SOLUTION INTRAVENOUS at 09:14

## 2022-06-13 RX ORDER — CYANOCOBALAMIN 1000 UG/ML
1000 INJECTION, SOLUTION INTRAMUSCULAR; SUBCUTANEOUS
Qty: 10 ML | Refills: 0 | Status: SHIPPED | OUTPATIENT
Start: 2022-06-13

## 2022-08-29 RX ORDER — ATORVASTATIN CALCIUM 10 MG/1
TABLET, FILM COATED ORAL
Qty: 30 TABLET | Refills: 0 | Status: SHIPPED | OUTPATIENT
Start: 2022-08-29 | End: 2022-11-17

## 2022-08-29 RX ORDER — CARVEDILOL 6.25 MG/1
TABLET ORAL
Qty: 60 TABLET | Refills: 0 | Status: SHIPPED | OUTPATIENT
Start: 2022-08-29 | End: 2022-11-17

## 2022-08-29 NOTE — TELEPHONE ENCOUNTER
Rx Refill Note  Requested Prescriptions     Pending Prescriptions Disp Refills   • carvedilol (COREG) 6.25 MG tablet [Pharmacy Med Name: CARVEDILOL 6.25MG TABLETS] 60 tablet 0     Sig: TAKE 1 TABLET BY MOUTH TWICE DAILY   • atorvastatin (LIPITOR) 10 MG tablet [Pharmacy Med Name: ATORVASTATIN 10MG TABLETS] 30 tablet 0     Sig: TAKE 1 TABLET BY MOUTH DAILY      Last office visit with prescribing clinician: 6/10/2021      Next office visit with prescribing clinician: Visit date not found            Tracey Tenorio MA  08/29/22, 08:32 EDT

## 2022-11-17 RX ORDER — ATORVASTATIN CALCIUM 10 MG/1
TABLET, FILM COATED ORAL
Qty: 30 TABLET | Refills: 0 | Status: SHIPPED | OUTPATIENT
Start: 2022-11-17 | End: 2022-11-17

## 2022-11-17 RX ORDER — CARVEDILOL 6.25 MG/1
TABLET ORAL
Qty: 60 TABLET | Refills: 0 | Status: SHIPPED | OUTPATIENT
Start: 2022-11-17 | End: 2022-11-17

## 2022-11-17 RX ORDER — ATORVASTATIN CALCIUM 10 MG/1
TABLET, FILM COATED ORAL
Qty: 90 TABLET | Refills: 1 | Status: SHIPPED | OUTPATIENT
Start: 2022-11-17

## 2022-11-17 RX ORDER — CARVEDILOL 6.25 MG/1
TABLET ORAL
Qty: 180 TABLET | Refills: 1 | Status: SHIPPED | OUTPATIENT
Start: 2022-11-17

## 2022-11-17 NOTE — TELEPHONE ENCOUNTER
Rx Refill Note  Requested Prescriptions     Pending Prescriptions Disp Refills   • atorvastatin (LIPITOR) 10 MG tablet [Pharmacy Med Name: ATORVASTATIN 10MG TABLETS] 30 tablet 0     Sig: TAKE 1 TABLET BY MOUTH DAILY   • carvedilol (COREG) 6.25 MG tablet [Pharmacy Med Name: CARVEDILOL 6.25MG TABLETS] 60 tablet 0     Sig: TAKE 1 TABLET BY MOUTH TWICE DAILY      Last office visit with prescribing clinician: 6/10/2021      Next office visit with prescribing clinician: Visit date not found            Reina Peace MA  11/17/22, 08:26 EST

## 2022-11-17 NOTE — TELEPHONE ENCOUNTER
Rx Refill Note  Requested Prescriptions     Pending Prescriptions Disp Refills   • atorvastatin (LIPITOR) 10 MG tablet [Pharmacy Med Name: ATORVASTATIN 10MG TABLETS] 90 tablet      Sig: TAKE 1 TABLET BY MOUTH DAILY   • carvedilol (COREG) 6.25 MG tablet [Pharmacy Med Name: CARVEDILOL 6.25MG TABLETS] 180 tablet      Sig: TAKE 1 TABLET BY MOUTH TWICE DAILY      Last office visit with prescribing clinician: 6/10/2021      Next office visit with prescribing clinician: Visit date not found            Reina Peace MA  11/17/22, 08:52 EST

## 2023-05-11 RX ORDER — CARVEDILOL 6.25 MG/1
TABLET ORAL
Qty: 180 TABLET | Refills: 1 | OUTPATIENT
Start: 2023-05-11

## 2023-05-11 RX ORDER — ATORVASTATIN CALCIUM 10 MG/1
TABLET, FILM COATED ORAL
Qty: 90 TABLET | Refills: 1 | OUTPATIENT
Start: 2023-05-11

## 2023-05-11 NOTE — TELEPHONE ENCOUNTER
Rx Refill Note  Requested Prescriptions     Pending Prescriptions Disp Refills   • carvedilol (COREG) 6.25 MG tablet [Pharmacy Med Name: CARVEDILOL 6.25MG TABLETS] 180 tablet 1     Sig: TAKE 1 TABLET BY MOUTH TWICE DAILY   • atorvastatin (LIPITOR) 10 MG tablet [Pharmacy Med Name: ATORVASTATIN 10MG TABLETS] 90 tablet 1     Sig: TAKE 1 TABLET BY MOUTH DAILY      Last office visit with prescribing clinician: 6/10/2021   Last telemedicine visit with prescribing clinician: 11/17/2022   Next office visit with prescribing clinician: Visit date not found                         Would you like a call back once the refill request has been completed: [] Yes [] No    If the office needs to give you a call back, can they leave a voicemail: [] Yes [] No    Reina Peace MA  05/11/23, 08:51 EDT

## 2023-05-17 ENCOUNTER — OFFICE VISIT (OUTPATIENT)
Dept: CARDIOLOGY | Facility: CLINIC | Age: 68
End: 2023-05-17
Payer: MEDICARE

## 2023-05-17 VITALS
WEIGHT: 207.5 LBS | HEIGHT: 63 IN | DIASTOLIC BLOOD PRESSURE: 78 MMHG | HEART RATE: 62 BPM | OXYGEN SATURATION: 97 % | BODY MASS INDEX: 36.77 KG/M2 | SYSTOLIC BLOOD PRESSURE: 149 MMHG

## 2023-05-17 DIAGNOSIS — E78.5 HYPERLIPIDEMIA, UNSPECIFIED HYPERLIPIDEMIA TYPE: ICD-10-CM

## 2023-05-17 DIAGNOSIS — I10 PRIMARY HYPERTENSION: ICD-10-CM

## 2023-05-17 DIAGNOSIS — I25.10 ATHEROSCLEROSIS OF NATIVE CORONARY ARTERY OF NATIVE HEART WITHOUT ANGINA PECTORIS: Primary | ICD-10-CM

## 2023-05-17 RX ORDER — ATORVASTATIN CALCIUM 10 MG/1
10 TABLET, FILM COATED ORAL DAILY
Qty: 90 TABLET | Refills: 3 | Status: SHIPPED | OUTPATIENT
Start: 2023-05-17

## 2023-05-17 RX ORDER — CARVEDILOL 12.5 MG/1
6.25 TABLET ORAL 2 TIMES DAILY
Qty: 90 TABLET | Refills: 3 | Status: SHIPPED | OUTPATIENT
Start: 2023-05-17

## 2023-05-22 ENCOUNTER — TELEPHONE (OUTPATIENT)
Dept: CARDIOLOGY | Facility: CLINIC | Age: 68
End: 2023-05-22
Payer: MEDICARE

## 2023-05-22 DIAGNOSIS — I10 PRIMARY HYPERTENSION: ICD-10-CM

## 2023-05-22 NOTE — TELEPHONE ENCOUNTER
Called patient and verbally explained that Alicia will be back in office tomorrow and will get in contact with her as soon as she can.    -Patient also states she is confused on her Mychart that it states she had a stent and patient states she has had no stents. Patient would also like to discuss that as well.    Office Visit with Alicia Mcmullen APRN (05/17/2023)

## 2023-05-22 NOTE — TELEPHONE ENCOUNTER
PATIENT WOULD LIKE TO SPEAK TO BARBIE. SHE WAS IN OFFICE LAST WEEK AND THOUGHT A MEDICATION WAS GOING TO BE INCREASED, BUT WHEN SHE WENT TO PHARMACY MEDICATION WAS THE SAME.

## 2023-05-23 RX ORDER — CARVEDILOL 12.5 MG/1
12.5 TABLET ORAL 2 TIMES DAILY
Qty: 90 TABLET | Refills: 3 | Status: SHIPPED | OUTPATIENT
Start: 2023-05-23

## 2023-05-23 NOTE — TELEPHONE ENCOUNTER
Called patient and verbally explained that we will be increasing her coreg 12.5 mg twice daily and sent in her new prescription to her pharmacy. I also verbally explained that the note will be changed.    -patient verbally understood.

## 2023-05-23 NOTE — TELEPHONE ENCOUNTER
Rx Refill Note  Requested Prescriptions     Pending Prescriptions Disp Refills   • carvedilol (COREG) 12.5 MG tablet 90 tablet 3     Sig: Take 1 tablet by mouth 2 (Two) Times a Day.      Last office visit with prescribing clinician: 5/17/2023   Last telemedicine visit with prescribing clinician: Visit date not found   Next office visit with prescribing clinician: 11/20/2023                         Would you like a call back once the refill request has been completed: [] Yes [] No    If the office needs to give you a call back, can they leave a voicemail: [] Yes [] No    Reina Peace MA  05/23/23, 09:30 EDT

## 2023-05-23 NOTE — TELEPHONE ENCOUNTER
Please call patient and let her know on Epic and my note carvedilol was incrased to 12.5 mg BID instead of 6.25 mg BID. I am happy to resend medication if needed.   Also yes tell patient I am sorry, I got that wrong, she has not had a stent placed and I will edit my note. She has a history of nonobstructive CAD. Thank you and let me know if she has any further questions or concerns.

## 2023-06-05 ENCOUNTER — OFFICE VISIT (OUTPATIENT)
Dept: FAMILY MEDICINE CLINIC | Facility: CLINIC | Age: 68
End: 2023-06-05
Payer: MEDICARE

## 2023-06-05 ENCOUNTER — LAB (OUTPATIENT)
Dept: LAB | Facility: HOSPITAL | Age: 68
End: 2023-06-05
Payer: MEDICARE

## 2023-06-05 ENCOUNTER — TELEPHONE (OUTPATIENT)
Dept: FAMILY MEDICINE CLINIC | Facility: CLINIC | Age: 68
End: 2023-06-05

## 2023-06-05 VITALS
TEMPERATURE: 98.3 F | SYSTOLIC BLOOD PRESSURE: 160 MMHG | DIASTOLIC BLOOD PRESSURE: 100 MMHG | OXYGEN SATURATION: 94 % | HEIGHT: 63 IN | BODY MASS INDEX: 36.86 KG/M2 | WEIGHT: 208 LBS | HEART RATE: 64 BPM

## 2023-06-05 DIAGNOSIS — E55.9 VITAMIN D DEFICIENCY: ICD-10-CM

## 2023-06-05 DIAGNOSIS — R53.83 FATIGUE, UNSPECIFIED TYPE: Primary | ICD-10-CM

## 2023-06-05 DIAGNOSIS — I25.10 ATHEROSCLEROSIS OF NATIVE CORONARY ARTERY OF NATIVE HEART WITHOUT ANGINA PECTORIS: Chronic | ICD-10-CM

## 2023-06-05 DIAGNOSIS — I10 PRIMARY HYPERTENSION: Chronic | ICD-10-CM

## 2023-06-05 DIAGNOSIS — E53.8 B12 DEFICIENCY: ICD-10-CM

## 2023-06-05 DIAGNOSIS — M25.50 MULTIPLE JOINT PAIN: ICD-10-CM

## 2023-06-05 DIAGNOSIS — E78.5 HYPERLIPIDEMIA, UNSPECIFIED HYPERLIPIDEMIA TYPE: Chronic | ICD-10-CM

## 2023-06-05 DIAGNOSIS — R53.83 FATIGUE, UNSPECIFIED TYPE: ICD-10-CM

## 2023-06-05 LAB
25(OH)D3 SERPL-MCNC: 20 NG/ML (ref 30–100)
ALBUMIN SERPL-MCNC: 4.2 G/DL (ref 3.5–5.2)
ALBUMIN/GLOB SERPL: 1.6 G/DL
ALP SERPL-CCNC: 103 U/L (ref 39–117)
ALT SERPL W P-5'-P-CCNC: 17 U/L (ref 1–33)
ANION GAP SERPL CALCULATED.3IONS-SCNC: 7.4 MMOL/L (ref 5–15)
ASO AB SERPL-ACNC: NEGATIVE [IU]/ML
AST SERPL-CCNC: 18 U/L (ref 1–32)
BASOPHILS # BLD AUTO: 0.05 10*3/MM3 (ref 0–0.2)
BASOPHILS NFR BLD AUTO: 0.7 % (ref 0–1.5)
BILIRUB SERPL-MCNC: 0.6 MG/DL (ref 0–1.2)
BUN SERPL-MCNC: 11 MG/DL (ref 8–23)
BUN/CREAT SERPL: 17.2 (ref 7–25)
CALCIUM SPEC-SCNC: 9.7 MG/DL (ref 8.6–10.5)
CHLORIDE SERPL-SCNC: 104 MMOL/L (ref 98–107)
CHOLEST SERPL-MCNC: 180 MG/DL (ref 0–200)
CHROMATIN AB SERPL-ACNC: 10.3 IU/ML (ref 0–14)
CO2 SERPL-SCNC: 26.6 MMOL/L (ref 22–29)
CREAT SERPL-MCNC: 0.64 MG/DL (ref 0.57–1)
CRP SERPL-MCNC: <0.3 MG/DL (ref 0–0.5)
DEPRECATED RDW RBC AUTO: 40.7 FL (ref 37–54)
EGFRCR SERPLBLD CKD-EPI 2021: 98.2 ML/MIN/1.73
EOSINOPHIL # BLD AUTO: 0.1 10*3/MM3 (ref 0–0.4)
EOSINOPHIL NFR BLD AUTO: 1.5 % (ref 0.3–6.2)
ERYTHROCYTE [DISTWIDTH] IN BLOOD BY AUTOMATED COUNT: 12.4 % (ref 12.3–15.4)
FOLATE SERPL-MCNC: >20 NG/ML (ref 4.78–24.2)
GLOBULIN UR ELPH-MCNC: 2.6 GM/DL
GLUCOSE SERPL-MCNC: 83 MG/DL (ref 65–99)
HCT VFR BLD AUTO: 39.6 % (ref 34–46.6)
HDLC SERPL-MCNC: 73 MG/DL (ref 40–60)
HGB BLD-MCNC: 13.1 G/DL (ref 12–15.9)
IMM GRANULOCYTES # BLD AUTO: 0.03 10*3/MM3 (ref 0–0.05)
IMM GRANULOCYTES NFR BLD AUTO: 0.4 % (ref 0–0.5)
LDLC SERPL CALC-MCNC: 89 MG/DL (ref 0–100)
LDLC/HDLC SERPL: 1.18 {RATIO}
LYMPHOCYTES # BLD AUTO: 1.77 10*3/MM3 (ref 0.7–3.1)
LYMPHOCYTES NFR BLD AUTO: 26.4 % (ref 19.6–45.3)
MCH RBC QN AUTO: 29.8 PG (ref 26.6–33)
MCHC RBC AUTO-ENTMCNC: 33.1 G/DL (ref 31.5–35.7)
MCV RBC AUTO: 90 FL (ref 79–97)
MONOCYTES # BLD AUTO: 0.62 10*3/MM3 (ref 0.1–0.9)
MONOCYTES NFR BLD AUTO: 9.2 % (ref 5–12)
NEUTROPHILS NFR BLD AUTO: 4.14 10*3/MM3 (ref 1.7–7)
NEUTROPHILS NFR BLD AUTO: 61.8 % (ref 42.7–76)
NRBC BLD AUTO-RTO: 0 /100 WBC (ref 0–0.2)
PLATELET # BLD AUTO: 319 10*3/MM3 (ref 140–450)
PMV BLD AUTO: 9.7 FL (ref 6–12)
POTASSIUM SERPL-SCNC: 4.7 MMOL/L (ref 3.5–5.2)
PROT SERPL-MCNC: 6.8 G/DL (ref 6–8.5)
RBC # BLD AUTO: 4.4 10*6/MM3 (ref 3.77–5.28)
SODIUM SERPL-SCNC: 138 MMOL/L (ref 136–145)
T4 FREE SERPL-MCNC: 1.06 NG/DL (ref 0.93–1.7)
TRIGL SERPL-MCNC: 104 MG/DL (ref 0–150)
TSH SERPL DL<=0.05 MIU/L-ACNC: 2.59 UIU/ML (ref 0.27–4.2)
URATE SERPL-MCNC: 3.7 MG/DL (ref 2.4–5.7)
VIT B12 BLD-MCNC: 490 PG/ML (ref 211–946)
VLDLC SERPL-MCNC: 18 MG/DL (ref 5–40)
WBC NRBC COR # BLD: 6.71 10*3/MM3 (ref 3.4–10.8)

## 2023-06-05 PROCEDURE — 84550 ASSAY OF BLOOD/URIC ACID: CPT

## 2023-06-05 PROCEDURE — 86038 ANTINUCLEAR ANTIBODIES: CPT

## 2023-06-05 PROCEDURE — 80053 COMPREHEN METABOLIC PANEL: CPT

## 2023-06-05 PROCEDURE — 84443 ASSAY THYROID STIM HORMONE: CPT

## 2023-06-05 PROCEDURE — 86063 ANTISTREPTOLYSIN O SCREEN: CPT

## 2023-06-05 PROCEDURE — 36415 COLL VENOUS BLD VENIPUNCTURE: CPT

## 2023-06-05 PROCEDURE — 86431 RHEUMATOID FACTOR QUANT: CPT

## 2023-06-05 PROCEDURE — 86140 C-REACTIVE PROTEIN: CPT

## 2023-06-05 PROCEDURE — 82607 VITAMIN B-12: CPT

## 2023-06-05 PROCEDURE — 85025 COMPLETE CBC W/AUTO DIFF WBC: CPT

## 2023-06-05 PROCEDURE — 80061 LIPID PANEL: CPT

## 2023-06-05 PROCEDURE — 84439 ASSAY OF FREE THYROXINE: CPT

## 2023-06-05 PROCEDURE — 82746 ASSAY OF FOLIC ACID SERUM: CPT

## 2023-06-05 PROCEDURE — 82306 VITAMIN D 25 HYDROXY: CPT

## 2023-06-05 RX ORDER — AMLODIPINE BESYLATE 5 MG/1
5 TABLET ORAL DAILY
Qty: 30 TABLET | Refills: 0 | Status: SHIPPED | OUTPATIENT
Start: 2023-06-05

## 2023-06-05 RX ORDER — AMLODIPINE BESYLATE 5 MG/1
5 TABLET ORAL DAILY
Qty: 30 TABLET | Refills: 0 | Status: SHIPPED | OUTPATIENT
Start: 2023-06-05 | End: 2023-06-05 | Stop reason: SDUPTHER

## 2023-06-05 NOTE — PROGRESS NOTES
"Subjective        Madie Lopez is a 67 y.o. female.     Chief Complaint   Patient presents with    Generalized Body Aches     Generalized aches and pains x2 weeks  BMI DUE       History of Present Illness  Patient is here for fatigue muscle aches over several months, feels like she swallowed pill in her throat . She said this comes together the fatigue and feeling she has a pill in her throat.   Symptoms wax and wane. She has not had any healthcare since 2021 due to family issues.     Fatigue muscle aches: off and on for months. Waxes and wanes then she notices she feels she has something in her throat. No difficulty swallowing. She recently saw cardiac nurse practitioner: reported intermittent palpitations, lightheadedness and edema.   2018 nuclear tress test; no evidence of ischemia. No chest pain no wall motion abnormality EF 71%.  Review of notes states patient has had stent placement in past she reports she has never had stent placement.     Atherosclerotic heart disease : she denies she has had stent placement. Currently on carvedilol.    Hyperlipidemia  taking atorvastatin 10 mg daily     B12 def no recent labs giving b12 injections monthly.    Hypertension; taking carvedilol 12.5 mg one in am and taking 1/2 pm due to hr 55 in afternoon. High in office today.     The following portions of the patient's history were reviewed and updated as appropriate: allergies, current medications, past family history, past medical history, past social history, past surgical history and problem list.      Current Outpatient Medications:     aspirin 81 MG EC tablet, ASPIR-LOW 81 MG TBEC, Disp: , Rfl:     atorvastatin (LIPITOR) 10 MG tablet, Take 1 tablet by mouth Daily., Disp: 90 tablet, Rfl: 3    B-D 3CC LUER-KATHIE SYR 23GX1\" 23G X 1\" 3 ML misc, USE FOR B 12 INJECTIONS, Disp: , Rfl: 0    carvedilol (COREG) 12.5 MG tablet, Take 1 tablet by mouth 2 (Two) Times a Day., Disp: 90 tablet, Rfl: 3    cyanocobalamin 1000 " "MCG/ML injection, Inject 1 mL into the appropriate muscle as directed by prescriber Every 30 (Thirty) Days., Disp: 10 mL, Rfl: 0    amLODIPine (NORVASC) 5 MG tablet, Take 1 tablet by mouth Daily., Disp: 30 tablet, Rfl: 0    No results found for this or any previous visit (from the past 4032 hour(s)).      Review of Systems    Objective     /100   Pulse 64   Temp 98.3 °F (36.8 °C) (Infrared)   Ht 160 cm (63\")   Wt 94.3 kg (208 lb)   SpO2 94%   BMI 36.85 kg/m²     Physical Exam  Vitals and nursing note reviewed.   Constitutional:       Appearance: She is obese.   HENT:      Head: Normocephalic.      Right Ear: External ear normal.      Left Ear: External ear normal.      Nose: Nose normal.      Mouth/Throat:      Mouth: Mucous membranes are moist.   Eyes:      Pupils: Pupils are equal, round, and reactive to light.   Cardiovascular:      Rate and Rhythm: Normal rate and regular rhythm.      Pulses: Normal pulses.      Heart sounds: Normal heart sounds.   Pulmonary:      Effort: Pulmonary effort is normal.      Breath sounds: Normal breath sounds.   Abdominal:      General: Bowel sounds are normal.      Palpations: Abdomen is soft.   Musculoskeletal:      Cervical back: Neck supple.   Skin:     General: Skin is warm.      Capillary Refill: Capillary refill takes less than 2 seconds.   Neurological:      General: No focal deficit present.      Mental Status: She is alert and oriented to person, place, and time.   Psychiatric:         Mood and Affect: Mood normal.         Thought Content: Thought content normal.       Result Review :                Assessment & Plan    Diagnoses and all orders for this visit:    1. Fatigue, unspecified type (Primary)  Comments:  labs and testing ordered  Orders:  -     CBC & Differential; Future  -     TSH; Future  -     T4, free; Future  -     Vitamin B12; Future  -     Folate; Future    2. Atherosclerosis of native coronary artery of native heart without angina " pectoris  Comments:  there is documentation in chart that   patient has had stent in past.    3. Hyperlipidemia, unspecified hyperlipidemia type  Comments:  labs orderd  Orders:  -     Lipid Panel; Future  -     Comprehensive Metabolic Panel; Future    4. Primary hypertension  Comments:  stable  Orders:  -     Lipid Panel; Future  -     Comprehensive Metabolic Panel; Future    5. B12 deficiency  -     Vitamin B12; Future  -     Folate; Future    6. Vitamin D deficiency  -     Vitamin D,25-Hydroxy; Future    7. Multiple joint pain  -     Uric acid; Future  -     Antistreptolysin O screen; Future  -     Rheumatoid Factor; Future  -     C-reactive protein; Future  -     GALE; Future    Other orders  -     amLODIPine (NORVASC) 5 MG tablet; Take 1 tablet by mouth Daily.  Dispense: 30 tablet; Refill: 0      Patient Instructions   Monitor heartbeat and blood pressure.  Take amlopine 5 mg daily  Follow up on labs.       Follow Up   Return in about 1 month (around 7/5/2023).    Patient was given instructions and counseling regarding her condition or for health maintenance advice. Please see specific information pulled into the AVS if appropriate.     Chantell Church, APRN    06/05/23

## 2023-06-05 NOTE — TELEPHONE ENCOUNTER
Caller: Madie Lopez    Relationship: Self    Best call back number: [phone]     What was the call regarding:  amLODIPine (NORVASC) 5 MG tablet [9071] (Order [id number])     PLEASE RESEND PRESCRIPTION TO:  [id number] - [place], [place] - [address] [facility] - [phone]  - [phone] FX

## 2023-06-07 LAB — ANA SER QL: NEGATIVE

## 2023-07-19 NOTE — TELEPHONE ENCOUNTER
Caller: Madie Lopez    Relationship: Self    Best call back number: [phone]     Requested Prescriptions:   Requested Prescriptions     Pending Prescriptions Disp Refills    cyanocobalamin 1000 MCG/ML injection 10 mL 0     Sig: Inject 1 mL into the appropriate muscle as directed by prescriber Every 30 (Thirty) Days.        Pharmacy where request should be sent: [id number] - [place], [place] - [address] [facility] [address] [place] [address] - [phone]  - [phone] FX     Last office visit with prescribing clinician: Visit date not found   Last telemedicine visit with prescribing clinician: Visit date not found   Next office visit with prescribing clinician: Visit date not found       Does the patient have less than a 3 day supply:  [x] Yes  [] No    Would you like a call back once the refill request has been completed: [] Yes [x] No    If the office needs to give you a call back, can they leave a voicemail: [] Yes [x] No    Jethro Cortez   07/19/23 09:37 EDT

## 2023-07-26 ENCOUNTER — TELEPHONE (OUTPATIENT)
Dept: FAMILY MEDICINE CLINIC | Facility: CLINIC | Age: 68
End: 2023-07-26
Payer: MEDICARE

## 2023-07-27 ENCOUNTER — TELEPHONE (OUTPATIENT)
Dept: FAMILY MEDICINE CLINIC | Facility: CLINIC | Age: 68
End: 2023-07-27
Payer: MEDICARE

## 2023-07-28 RX ORDER — CYANOCOBALAMIN 1000 UG/ML
1000 INJECTION, SOLUTION INTRAMUSCULAR; SUBCUTANEOUS
Qty: 10 ML | Refills: 0 | Status: SHIPPED | OUTPATIENT
Start: 2023-07-28

## 2023-08-08 ENCOUNTER — OFFICE VISIT (OUTPATIENT)
Dept: FAMILY MEDICINE CLINIC | Facility: CLINIC | Age: 68
End: 2023-08-08
Payer: MEDICARE

## 2023-08-08 VITALS
DIASTOLIC BLOOD PRESSURE: 90 MMHG | TEMPERATURE: 98.4 F | OXYGEN SATURATION: 95 % | HEIGHT: 63 IN | BODY MASS INDEX: 38.09 KG/M2 | SYSTOLIC BLOOD PRESSURE: 130 MMHG | RESPIRATION RATE: 16 BRPM | WEIGHT: 215 LBS | HEART RATE: 75 BPM

## 2023-08-08 DIAGNOSIS — Z23 NEED FOR PNEUMOCOCCAL 20-VALENT CONJUGATE VACCINATION: Primary | ICD-10-CM

## 2023-08-08 DIAGNOSIS — E66.01 CLASS 2 SEVERE OBESITY DUE TO EXCESS CALORIES WITH SERIOUS COMORBIDITY AND BODY MASS INDEX (BMI) OF 38.0 TO 38.9 IN ADULT: Chronic | ICD-10-CM

## 2023-08-08 DIAGNOSIS — I10 PRIMARY HYPERTENSION: Chronic | ICD-10-CM

## 2023-08-08 RX ORDER — HYDROCHLOROTHIAZIDE 12.5 MG/1
12.5 TABLET ORAL DAILY
Qty: 90 TABLET | Refills: 0 | Status: SHIPPED | OUTPATIENT
Start: 2023-08-08

## 2023-08-08 RX ORDER — AMLODIPINE BESYLATE 5 MG/1
5 TABLET ORAL DAILY
Qty: 90 TABLET | OUTPATIENT
Start: 2023-08-08

## 2023-08-08 RX ORDER — AMLODIPINE BESYLATE 5 MG/1
5 TABLET ORAL DAILY
Qty: 30 TABLET | Refills: 1 | Status: SHIPPED | OUTPATIENT
Start: 2023-08-08 | End: 2023-08-08

## 2023-08-08 NOTE — ASSESSMENT & PLAN NOTE
Patient's (Body mass index is 38.09 kg/mý.) indicates that they are morbidly/severely obese (BMI > 40 or > 35 with obesity - related health condition) with health conditions that include hypertension and dyslipidemias . Weight is worsening. BMI  is above average; BMI management plan is completed. We discussed portion control, increasing exercise, and joining a fitness center or start home based exercise program.

## 2023-08-08 NOTE — PATIENT INSTRUCTIONS
Start hctz.  Send message in one week with blood pressure check.   Weight should go down so should swelling  Exercising to Lose Weight  Getting regular exercise is important for everyone. It is especially important if you are overweight. Being overweight increases your risk of heart disease, stroke, diabetes, high blood pressure, and several types of cancer. Exercising, and reducing the calories you consume, can help you lose weight and improve fitness and health.  Exercise can be moderate or vigorous intensity. To lose weight, most people need to do a certain amount of moderate or vigorous-intensity exercise each week.  How can exercise affect me?  You lose weight when you exercise enough to burn more calories than you eat. Exercise also reduces body fat and builds muscle. The more muscle you have, the more calories you burn. Exercise also:  Improves mood.  Reduces stress and tension.  Improves your overall fitness, flexibility, and endurance.  Increases bone strength.  Moderate-intensity exercise    Moderate-intensity exercise is any activity that gets you moving enough to burn at least three times more energy (calories) than if you were sitting.  Examples of moderate exercise include:  Walking a mile in 15 minutes.  Doing light yard work.  Biking at an easy pace.  Most people should get at least 150 minutes of moderate-intensity exercise a week to maintain their body weight.  Vigorous-intensity exercise  Vigorous-intensity exercise is any activity that gets you moving enough to burn at least six times more calories than if you were sitting. When you exercise at this intensity, you should be working hard enough that you are not able to carry on a conversation.  Examples of vigorous exercise include:  Running.  Playing a team sport, such as football, basketball, and soccer.  Jumping rope.  Most people should get at least 75 minutes a week of vigorous exercise to maintain their body weight.  What actions can I take  to lose weight?  The amount of exercise you need to lose weight depends on:  Your age.  The type of exercise.  Any health conditions you have.  Your overall physical ability.  Talk to your health care provider about how much exercise you need and what types of activities are safe for you.  Nutrition    Make changes to your diet as told by your health care provider or diet and nutrition specialist (dietitian). This may include:  Eating fewer calories.  Eating more protein.  Eating less unhealthy fats.  Eating a diet that includes fresh fruits and vegetables, whole grains, low-fat dairy products, and lean protein.  Avoiding foods with added fat, salt, and sugar.  Drink plenty of water while you exercise to prevent dehydration or heat stroke.  Activity  Choose an activity that you enjoy and set realistic goals. Your health care provider can help you make an exercise plan that works for you.  Exercise at a moderate or vigorous intensity most days of the week.  The intensity of exercise may vary from person to person. You can tell how intense a workout is for you by paying attention to your breathing and heartbeat. Most people will notice their breathing and heartbeat get faster with more intense exercise.  Do resistance training twice each week, such as:  Push-ups.  Sit-ups.  Lifting weights.  Using resistance bands.  Getting short amounts of exercise can be just as helpful as long, structured periods of exercise. If you have trouble finding time to exercise, try doing these things as part of your daily routine:  Get up, stretch, and walk around every 30 minutes throughout the day.  Go for a walk during your lunch break.  Park your car farther away from your destination.  If you take public transportation, get off one stop early and walk the rest of the way.  Make phone calls while standing up and walking around.  Take the stairs instead of elevators or escalators.  Wear comfortable clothes and shoes with good  support.  Do not exercise so much that you hurt yourself, feel dizzy, or get very short of breath.  Where to find more information  [place] Department of Health and Human Services: www.hhs.gov  Centers for Disease Control and Prevention: www.cdc.gov  Contact a health care provider:  Before starting a new exercise program.  If you have questions or concerns about your weight.  If you have a medical problem that keeps you from exercising.  Get help right away if:  You have any of the following while exercising:  Injury.  Dizziness.  Difficulty breathing or shortness of breath that does not go away when you stop exercising.  Chest pain.  Rapid heartbeat.  These symptoms may represent a serious problem that is an emergency. Do not wait to see if the symptoms will go away. Get medical help right away. Call your local emergency services (911 in the [place] Do not drive yourself to the hospital.  Summary  Getting regular exercise is especially important if you are overweight.  Being overweight increases your risk of heart disease, stroke, diabetes, high blood pressure, and several types of cancer.  Losing weight happens when you burn more calories than you eat.  Reducing the amount of calories you eat, and getting regular moderate or vigorous exercise each week, helps you lose weight.  This information is not intended to replace advice given to you by your health care provider. Make sure you discuss any questions you have with your health care provider.  Document Revised: 02/13/2022 Document Reviewed: 02/13/2022  [employer] Patient Education c 2023 [employer]

## 2023-08-08 NOTE — PROGRESS NOTES
"Subjective        Madie Lopez is a 68 y.o. female.     Chief Complaint   Patient presents with    Hypertension     1 mth f/u; Med refill; BMI DUE       Hypertension    Patient is here for management of her hypertension: she ran out of her amlodipine.  She was checking while on meds and running 120/70-80     Hypertension: amlodipine 5 mg daily (ran out 5 days ago) carvedilol 12.5 mg bid she is gaining weight and legs are swelling . She has been eating healthier and is gaining.       BMI 38. Discussed how to exercise and eat healthy fib er, daily exercise limits fruit.   Moderation is key.       The following portions of the patient's history were reviewed and updated as appropriate: allergies, current medications, past family history, past medical history, past social history, past surgical history and problem list.      Current Outpatient Medications:     aspirin 81 MG EC tablet, ASPIR-LOW 81 MG TBEC, Disp: , Rfl:     atorvastatin (LIPITOR) 10 MG tablet, Take 1 tablet by mouth Daily., Disp: 90 tablet, Rfl: 3    B-D 3CC LUER-KATHIE SYR 23GX1\" 23G X 1\" 3 ML misc, USE FOR B 12 INJECTIONS, Disp: , Rfl: 0    carvedilol (COREG) 12.5 MG tablet, Take 1 tablet by mouth 2 (Two) Times a Day., Disp: 90 tablet, Rfl: 3    cyanocobalamin 1000 MCG/ML injection, Inject 1 mL into the appropriate muscle as directed by prescriber Every 30 (Thirty) Days., Disp: 10 mL, Rfl: 0    hydroCHLOROthiazide (HYDRODIURIL) 12.5 MG tablet, Take 1 tablet by mouth Daily., Disp: 90 tablet, Rfl: 0    Recent Results (from the past 4032 hour(s))   Lipid Panel    Collection Time: 06/05/23 10:48 AM    Specimen: Blood   Result Value Ref Range    Total Cholesterol 180 0 - 200 mg/dL    Triglycerides 104 0 - 150 mg/dL    HDL Cholesterol 73 (H) 40 - 60 mg/dL    LDL Cholesterol  89 0 - 100 mg/dL    VLDL Cholesterol 18 5 - 40 mg/dL    LDL/HDL Ratio 1.18    Comprehensive Metabolic Panel    Collection Time: 06/05/23 10:48 AM    Specimen: Blood   Result Value " Ref Range    Glucose 83 65 - 99 mg/dL    BUN 11 8 - 23 mg/dL    Creatinine 0.64 0.57 - 1.00 mg/dL    Sodium 138 136 - 145 mmol/L    Potassium 4.7 3.5 - 5.2 mmol/L    Chloride 104 98 - 107 mmol/L    CO2 26.6 22.0 - 29.0 mmol/L    Calcium 9.7 8.6 - 10.5 mg/dL    Total Protein 6.8 6.0 - 8.5 g/dL    Albumin 4.2 3.5 - 5.2 g/dL    ALT (SGPT) 17 1 - 33 U/L    AST (SGOT) 18 1 - 32 U/L    Alkaline Phosphatase 103 39 - 117 U/L    Total Bilirubin 0.6 0.0 - 1.2 mg/dL    Globulin 2.6 gm/dL    A/G Ratio 1.6 g/dL    BUN/Creatinine Ratio 17.2 7.0 - 25.0    Anion Gap 7.4 5.0 - 15.0 mmol/L    eGFR 98.2 >60.0 mL/min/1.73   TSH    Collection Time: 06/05/23 10:48 AM    Specimen: Blood   Result Value Ref Range    TSH 2.590 0.270 - 4.200 uIU/mL   T4, free    Collection Time: 06/05/23 10:48 AM    Specimen: Blood   Result Value Ref Range    Free T4 1.06 0.93 - 1.70 ng/dL   Vitamin B12    Collection Time: 06/05/23 10:48 AM    Specimen: Blood   Result Value Ref Range    Vitamin B-12 490 211 - 946 pg/mL   Folate    Collection Time: 06/05/23 10:48 AM    Specimen: Blood   Result Value Ref Range    Folate >20.00 4.78 - 24.20 ng/mL   Vitamin D,25-Hydroxy    Collection Time: 06/05/23 10:48 AM    Specimen: Blood   Result Value Ref Range    25 Hydroxy, Vitamin D 20.0 (L) 30.0 - 100.0 ng/ml   Uric acid    Collection Time: 06/05/23 10:48 AM    Specimen: Blood   Result Value Ref Range    Uric Acid 3.7 2.4 - 5.7 mg/dL   Antistreptolysin O screen    Collection Time: 06/05/23 10:48 AM    Specimen: Blood   Result Value Ref Range    ASO Negative Negative   Rheumatoid Factor    Collection Time: 06/05/23 10:48 AM    Specimen: Blood   Result Value Ref Range    Rheumatoid Factor Quantitative 10.3 0.0 - 14.0 IU/mL   C-reactive protein    Collection Time: 06/05/23 10:48 AM    Specimen: Blood   Result Value Ref Range    C-Reactive Protein <0.30 0.00 - 0.50 mg/dL   GALE    Collection Time: 06/05/23 10:48 AM    Specimen: Blood   Result Value Ref Range    Antinuclear  "Antibodies (GALE) Negative Negative   CBC Auto Differential    Collection Time: 06/05/23 10:48 AM    Specimen: Blood   Result Value Ref Range    WBC 6.71 3.40 - 10.80 10*3/mm3    RBC 4.40 3.77 - 5.28 10*6/mm3    Hemoglobin 13.1 12.0 - 15.9 g/dL    Hematocrit 39.6 34.0 - 46.6 %    MCV 90.0 79.0 - 97.0 fL    MCH 29.8 26.6 - 33.0 pg    MCHC 33.1 31.5 - 35.7 g/dL    RDW 12.4 12.3 - 15.4 %    RDW-SD 40.7 37.0 - 54.0 fl    MPV 9.7 6.0 - 12.0 fL    Platelets 319 140 - 450 10*3/mm3    Neutrophil % 61.8 42.7 - 76.0 %    Lymphocyte % 26.4 19.6 - 45.3 %    Monocyte % 9.2 5.0 - 12.0 %    Eosinophil % 1.5 0.3 - 6.2 %    Basophil % 0.7 0.0 - 1.5 %    Immature Grans % 0.4 0.0 - 0.5 %    Neutrophils, Absolute 4.14 1.70 - 7.00 10*3/mm3    Lymphocytes, Absolute 1.77 0.70 - 3.10 10*3/mm3    Monocytes, Absolute 0.62 0.10 - 0.90 10*3/mm3    Eosinophils, Absolute 0.10 0.00 - 0.40 10*3/mm3    Basophils, Absolute 0.05 0.00 - 0.20 10*3/mm3    Immature Grans, Absolute 0.03 0.00 - 0.05 10*3/mm3    nRBC 0.0 0.0 - 0.2 /100 WBC         Review of Systems    Objective     /90   Pulse 75   Temp 98.4 øF (36.9 øC) (Temporal)   Resp 16   Ht 160 cm (63\")   Wt 97.5 kg (215 lb)   SpO2 95%   BMI 38.09 kg/mý     Physical Exam  Vitals and nursing note reviewed.   Constitutional:       Appearance: She is obese.   HENT:      Head: Normocephalic.      Right Ear: External ear normal.      Left Ear: External ear normal.      Nose: Nose normal.      Mouth/Throat:      Mouth: Mucous membranes are moist.   Eyes:      Pupils: Pupils are equal, round, and reactive to light.   Cardiovascular:      Rate and Rhythm: Normal rate and regular rhythm.      Pulses: Normal pulses.      Heart sounds: Normal heart sounds.   Pulmonary:      Effort: Pulmonary effort is normal.      Breath sounds: Normal breath sounds.   Abdominal:      Palpations: Abdomen is soft.   Skin:     General: Skin is warm.   Neurological:      General: No focal deficit present.      Mental " Status: She is alert and oriented to person, place, and time.   Psychiatric:         Mood and Affect: Mood normal.         Thought Content: Thought content normal.       Result Review :                Assessment & Plan    Diagnoses and all orders for this visit:    1. Need for pneumococcal 20-valent conjugate vaccination (Primary)  -     Pneumococcal Conjugate Vaccine 20-Valent (PCV20)    2. Primary hypertension  Comments:  start hctz    3. Class 2 severe obesity due to excess calories with serious comorbidity and body mass index (BMI) of 38.0 to 38.9 in adult  Comments:  discussed exercise and eating healthy counting carbs increase fiber  Assessment & Plan:  Patient's (Body mass index is 38.09 kg/mý.) indicates that they are morbidly/severely obese (BMI > 40 or > 35 with obesity - related health condition) with health conditions that include hypertension and dyslipidemias . Weight is worsening. BMI  is above average; BMI management plan is completed. We discussed portion control, increasing exercise, and joining a fitness center or start home based exercise program.       Other orders  -     Discontinue: amLODIPine (NORVASC) 5 MG tablet; Take 1 tablet by mouth Daily.  Dispense: 30 tablet; Refill: 1  -     hydroCHLOROthiazide (HYDRODIURIL) 12.5 MG tablet; Take 1 tablet by mouth Daily.  Dispense: 90 tablet; Refill: 0      Patient Instructions   Start hctz.  Send message in one week with blood pressure check.   Weight should go down so should swelling  Exercising to Lose Weight  Getting regular exercise is important for everyone. It is especially important if you are overweight. Being overweight increases your risk of heart disease, stroke, diabetes, high blood pressure, and several types of cancer. Exercising, and reducing the calories you consume, can help you lose weight and improve fitness and health.  Exercise can be moderate or vigorous intensity. To lose weight, most people need to do a certain amount of  moderate or vigorous-intensity exercise each week.  How can exercise affect me?  You lose weight when you exercise enough to burn more calories than you eat. Exercise also reduces body fat and builds muscle. The more muscle you have, the more calories you burn. Exercise also:  Improves mood.  Reduces stress and tension.  Improves your overall fitness, flexibility, and endurance.  Increases bone strength.  Moderate-intensity exercise    Moderate-intensity exercise is any activity that gets you moving enough to burn at least three times more energy (calories) than if you were sitting.  Examples of moderate exercise include:  Walking a mile in 15 minutes.  Doing light yard work.  Biking at an easy pace.  Most people should get at least 150 minutes of moderate-intensity exercise a week to maintain their body weight.  Vigorous-intensity exercise  Vigorous-intensity exercise is any activity that gets you moving enough to burn at least six times more calories than if you were sitting. When you exercise at this intensity, you should be working hard enough that you are not able to carry on a conversation.  Examples of vigorous exercise include:  Running.  Playing a team sport, such as football, basketball, and soccer.  Jumping rope.  Most people should get at least 75 minutes a week of vigorous exercise to maintain their body weight.  What actions can I take to lose weight?  The amount of exercise you need to lose weight depends on:  Your age.  The type of exercise.  Any health conditions you have.  Your overall physical ability.  Talk to your health care provider about how much exercise you need and what types of activities are safe for you.  Nutrition    Make changes to your diet as told by your health care provider or diet and nutrition specialist (dietitian). This may include:  Eating fewer calories.  Eating more protein.  Eating less unhealthy fats.  Eating a diet that includes fresh fruits and vegetables, whole grains,  low-fat dairy products, and lean protein.  Avoiding foods with added fat, salt, and sugar.  Drink plenty of water while you exercise to prevent dehydration or heat stroke.  Activity  Choose an activity that you enjoy and set realistic goals. Your health care provider can help you make an exercise plan that works for you.  Exercise at a moderate or vigorous intensity most days of the week.  The intensity of exercise may vary from person to person. You can tell how intense a workout is for you by paying attention to your breathing and heartbeat. Most people will notice their breathing and heartbeat get faster with more intense exercise.  Do resistance training twice each week, such as:  Push-ups.  Sit-ups.  Lifting weights.  Using resistance bands.  Getting short amounts of exercise can be just as helpful as long, structured periods of exercise. If you have trouble finding time to exercise, try doing these things as part of your daily routine:  Get up, stretch, and walk around every 30 minutes throughout the day.  Go for a walk during your lunch break.  Park your car farther away from your destination.  If you take public transportation, get off one stop early and walk the rest of the way.  Make phone calls while standing up and walking around.  Take the stairs instead of elevators or escalators.  Wear comfortable clothes and shoes with good support.  Do not exercise so much that you hurt yourself, feel dizzy, or get very short of breath.  Where to find more information  [place] Department of Health and Human Services: www.hhs.gov  Centers for Disease Control and Prevention: www.cdc.gov  Contact a health care provider:  Before starting a new exercise program.  If you have questions or concerns about your weight.  If you have a medical problem that keeps you from exercising.  Get help right away if:  You have any of the following while exercising:  Injury.  Dizziness.  Difficulty breathing or shortness of breath that does  not go away when you stop exercising.  Chest pain.  Rapid heartbeat.  These symptoms may represent a serious problem that is an emergency. Do not wait to see if the symptoms will go away. Get medical help right away. Call your local emergency services (911 in the [place] Do not drive yourself to the hospital.  Summary  Getting regular exercise is especially important if you are overweight.  Being overweight increases your risk of heart disease, stroke, diabetes, high blood pressure, and several types of cancer.  Losing weight happens when you burn more calories than you eat.  Reducing the amount of calories you eat, and getting regular moderate or vigorous exercise each week, helps you lose weight.  This information is not intended to replace advice given to you by your health care provider. Make sure you discuss any questions you have with your health care provider.  Document Revised: 02/13/2022 Document Reviewed: 02/13/2022  [employer] Patient Education c 2023 [employer]      Follow Up   Return in about 3 months (around 11/8/2023).    Patient was given instructions and counseling regarding her condition or for health maintenance advice. Please see specific information pulled into the AVS if appropriate.     Chantell Church, APRN    08/08/23    Answers submitted by the patient for this visit:  Primary Reason for Visit (Submitted on 8/1/2023)  What is the primary reason for your visit?: High Blood Pressure

## 2023-08-14 RX ORDER — CYANOCOBALAMIN 1000 UG/ML
1000 INJECTION, SOLUTION INTRAMUSCULAR; SUBCUTANEOUS
Qty: 10 ML | Refills: 0 | Status: SHIPPED | OUTPATIENT
Start: 2023-08-14

## 2023-10-30 ENCOUNTER — TELEPHONE (OUTPATIENT)
Dept: FAMILY MEDICINE CLINIC | Facility: CLINIC | Age: 68
End: 2023-10-30
Payer: MEDICARE

## 2023-10-30 RX ORDER — HYDROCHLOROTHIAZIDE 12.5 MG/1
12.5 TABLET ORAL DAILY
Qty: 90 TABLET | Refills: 0 | Status: SHIPPED | OUTPATIENT
Start: 2023-10-30

## 2023-11-16 ENCOUNTER — OFFICE VISIT (OUTPATIENT)
Dept: FAMILY MEDICINE CLINIC | Facility: CLINIC | Age: 68
End: 2023-11-16
Payer: MEDICARE

## 2023-11-16 VITALS
SYSTOLIC BLOOD PRESSURE: 160 MMHG | HEIGHT: 63 IN | TEMPERATURE: 98.6 F | DIASTOLIC BLOOD PRESSURE: 96 MMHG | HEART RATE: 61 BPM | OXYGEN SATURATION: 98 % | WEIGHT: 213 LBS | BODY MASS INDEX: 37.74 KG/M2

## 2023-11-16 DIAGNOSIS — Z23 NEED FOR IMMUNIZATION AGAINST INFLUENZA: ICD-10-CM

## 2023-11-16 DIAGNOSIS — E78.5 HYPERLIPIDEMIA, UNSPECIFIED HYPERLIPIDEMIA TYPE: Chronic | ICD-10-CM

## 2023-11-16 DIAGNOSIS — E55.9 VITAMIN D DEFICIENCY: Chronic | ICD-10-CM

## 2023-11-16 DIAGNOSIS — I25.10 ATHEROSCLEROSIS OF NATIVE CORONARY ARTERY OF NATIVE HEART WITHOUT ANGINA PECTORIS: Primary | ICD-10-CM

## 2023-11-16 DIAGNOSIS — E66.01 CLASS 2 SEVERE OBESITY DUE TO EXCESS CALORIES WITH SERIOUS COMORBIDITY AND BODY MASS INDEX (BMI) OF 37.0 TO 37.9 IN ADULT: Chronic | ICD-10-CM

## 2023-11-16 DIAGNOSIS — I10 PRIMARY HYPERTENSION: Chronic | ICD-10-CM

## 2023-11-16 DIAGNOSIS — E78.2 MIXED HYPERLIPIDEMIA: Chronic | ICD-10-CM

## 2023-11-16 DIAGNOSIS — E53.8 B12 DEFICIENCY: Chronic | ICD-10-CM

## 2023-11-16 RX ORDER — CARVEDILOL 25 MG/1
25 TABLET ORAL 2 TIMES DAILY
Qty: 90 TABLET | Refills: 1 | Status: SHIPPED | OUTPATIENT
Start: 2023-11-16 | End: 2023-11-16 | Stop reason: SDUPTHER

## 2023-11-16 RX ORDER — HYDROCHLOROTHIAZIDE 12.5 MG/1
12.5 TABLET ORAL DAILY
Qty: 90 TABLET | Refills: 0 | Status: SHIPPED | OUTPATIENT
Start: 2023-11-16

## 2023-11-16 RX ORDER — CARVEDILOL 12.5 MG/1
12.5 TABLET ORAL 2 TIMES DAILY
Qty: 90 TABLET | Refills: 3 | Status: SHIPPED | OUTPATIENT
Start: 2023-11-16 | End: 2023-11-16 | Stop reason: SDUPTHER

## 2023-11-16 RX ORDER — HYDROCHLOROTHIAZIDE 12.5 MG/1
12.5 TABLET ORAL DAILY
Qty: 90 TABLET | Refills: 0 | Status: SHIPPED | OUTPATIENT
Start: 2023-11-16 | End: 2023-11-16 | Stop reason: SDUPTHER

## 2023-11-16 RX ORDER — ATORVASTATIN CALCIUM 10 MG/1
10 TABLET, FILM COATED ORAL DAILY
Qty: 90 TABLET | Refills: 3 | Status: SHIPPED | OUTPATIENT
Start: 2023-11-16

## 2023-11-16 RX ORDER — CARVEDILOL 25 MG/1
25 TABLET ORAL 2 TIMES DAILY
Qty: 180 TABLET | Refills: 1 | Status: SHIPPED | OUTPATIENT
Start: 2023-11-16

## 2023-11-16 NOTE — ASSESSMENT & PLAN NOTE
Patient's (Body mass index is 37.73 kg/m².) indicates that they are obese (BMI >30) with health conditions that include hypertension and dyslipidemias . Weight is improving with lifestyle modifications. BMI  is above average; BMI management plan is completed. We discussed portion control and increasing exercise.

## 2023-11-16 NOTE — PATIENT INSTRUCTIONS
May need new blood pressure monitor  Start 25 mg carvedilol bid   Limit salt intake. Processed foods.   Try eating beets help lower blood pressure   Weight loss and exercise.

## 2023-11-16 NOTE — PROGRESS NOTES
"Subjective        Madie Lopez is a 68 y.o. female.     Chief Complaint   Patient presents with    Hyperlipidemia     3 month f/u       History of Present Illness  Patient is here for management of her chronic medical problems: CAD, hypertension, hyperlipidemia, b12 def, vit d def,     CAD: she is soon to move . She has not been seen of recent has hypertension , hyperlipidemia. On aspirin 81 mg daily.     Hypertension: carvedilol 12.5 mg bid. Hctz 12.5 mg daily has CAD.     Hyperlipidemia atorvastatin 10 mg daily.  normal tir 104 norml hdl 73 LDL 89 normal.     Mammogram completed we will get current records.           The following portions of the patient's history were reviewed and updated as appropriate: allergies, current medications, past family history, past medical history, past social history, past surgical history and problem list.      Current Outpatient Medications:     aspirin 81 MG EC tablet, ASPIR-LOW 81 MG TBEC, Disp: , Rfl:     atorvastatin (LIPITOR) 10 MG tablet, Take 1 tablet by mouth Daily., Disp: 90 tablet, Rfl: 3    B-D 3CC LUER-KATHIE SYR 23GX1\" 23G X 1\" 3 ML misc, USE FOR B 12 INJECTIONS, Disp: , Rfl: 0    carvedilol (COREG) 25 MG tablet, Take 1 tablet by mouth 2 (Two) Times a Day., Disp: 180 tablet, Rfl: 1    cyanocobalamin 1000 MCG/ML injection, Inject 1 mL into the appropriate muscle as directed by prescriber Every 30 (Thirty) Days., Disp: 10 mL, Rfl: 0    cyanocobalamin 1000 MCG/ML injection, Inject 1 mL into the appropriate muscle as directed by prescriber Every 30 (Thirty) Days., Disp: 10 mL, Rfl: 0    hydroCHLOROthiazide (HYDRODIURIL) 12.5 MG tablet, Take 1 tablet by mouth Daily., Disp: 90 tablet, Rfl: 0    Recent Results (from the past 4032 hour(s))   Lipid Panel    Collection Time: 06/05/23 10:48 AM    Specimen: Blood   Result Value Ref Range    Total Cholesterol 180 0 - 200 mg/dL    Triglycerides 104 0 - 150 mg/dL    HDL Cholesterol 73 (H) 40 - 60 mg/dL    LDL Cholesterol "  89 0 - 100 mg/dL    VLDL Cholesterol 18 5 - 40 mg/dL    LDL/HDL Ratio 1.18    Comprehensive Metabolic Panel    Collection Time: 06/05/23 10:48 AM    Specimen: Blood   Result Value Ref Range    Glucose 83 65 - 99 mg/dL    BUN 11 8 - 23 mg/dL    Creatinine 0.64 0.57 - 1.00 mg/dL    Sodium 138 136 - 145 mmol/L    Potassium 4.7 3.5 - 5.2 mmol/L    Chloride 104 98 - 107 mmol/L    CO2 26.6 22.0 - 29.0 mmol/L    Calcium 9.7 8.6 - 10.5 mg/dL    Total Protein 6.8 6.0 - 8.5 g/dL    Albumin 4.2 3.5 - 5.2 g/dL    ALT (SGPT) 17 1 - 33 U/L    AST (SGOT) 18 1 - 32 U/L    Alkaline Phosphatase 103 39 - 117 U/L    Total Bilirubin 0.6 0.0 - 1.2 mg/dL    Globulin 2.6 gm/dL    A/G Ratio 1.6 g/dL    BUN/Creatinine Ratio 17.2 7.0 - 25.0    Anion Gap 7.4 5.0 - 15.0 mmol/L    eGFR 98.2 >60.0 mL/min/1.73   TSH    Collection Time: 06/05/23 10:48 AM    Specimen: Blood   Result Value Ref Range    TSH 2.590 0.270 - 4.200 uIU/mL   T4, free    Collection Time: 06/05/23 10:48 AM    Specimen: Blood   Result Value Ref Range    Free T4 1.06 0.93 - 1.70 ng/dL   Vitamin B12    Collection Time: 06/05/23 10:48 AM    Specimen: Blood   Result Value Ref Range    Vitamin B-12 490 211 - 946 pg/mL   Folate    Collection Time: 06/05/23 10:48 AM    Specimen: Blood   Result Value Ref Range    Folate >20.00 4.78 - 24.20 ng/mL   Vitamin D,25-Hydroxy    Collection Time: 06/05/23 10:48 AM    Specimen: Blood   Result Value Ref Range    25 Hydroxy, Vitamin D 20.0 (L) 30.0 - 100.0 ng/ml   Uric acid    Collection Time: 06/05/23 10:48 AM    Specimen: Blood   Result Value Ref Range    Uric Acid 3.7 2.4 - 5.7 mg/dL   Antistreptolysin O screen    Collection Time: 06/05/23 10:48 AM    Specimen: Blood   Result Value Ref Range    ASO Negative Negative   Rheumatoid Factor    Collection Time: 06/05/23 10:48 AM    Specimen: Blood   Result Value Ref Range    Rheumatoid Factor Quantitative 10.3 0.0 - 14.0 IU/mL   C-reactive protein    Collection Time: 06/05/23 10:48 AM    Specimen:  "Blood   Result Value Ref Range    C-Reactive Protein <0.30 0.00 - 0.50 mg/dL   GALE    Collection Time: 06/05/23 10:48 AM    Specimen: Blood   Result Value Ref Range    Antinuclear Antibodies (GALE) Negative Negative   CBC Auto Differential    Collection Time: 06/05/23 10:48 AM    Specimen: Blood   Result Value Ref Range    WBC 6.71 3.40 - 10.80 10*3/mm3    RBC 4.40 3.77 - 5.28 10*6/mm3    Hemoglobin 13.1 12.0 - 15.9 g/dL    Hematocrit 39.6 34.0 - 46.6 %    MCV 90.0 79.0 - 97.0 fL    MCH 29.8 26.6 - 33.0 pg    MCHC 33.1 31.5 - 35.7 g/dL    RDW 12.4 12.3 - 15.4 %    RDW-SD 40.7 37.0 - 54.0 fl    MPV 9.7 6.0 - 12.0 fL    Platelets 319 140 - 450 10*3/mm3    Neutrophil % 61.8 42.7 - 76.0 %    Lymphocyte % 26.4 19.6 - 45.3 %    Monocyte % 9.2 5.0 - 12.0 %    Eosinophil % 1.5 0.3 - 6.2 %    Basophil % 0.7 0.0 - 1.5 %    Immature Grans % 0.4 0.0 - 0.5 %    Neutrophils, Absolute 4.14 1.70 - 7.00 10*3/mm3    Lymphocytes, Absolute 1.77 0.70 - 3.10 10*3/mm3    Monocytes, Absolute 0.62 0.10 - 0.90 10*3/mm3    Eosinophils, Absolute 0.10 0.00 - 0.40 10*3/mm3    Basophils, Absolute 0.05 0.00 - 0.20 10*3/mm3    Immature Grans, Absolute 0.03 0.00 - 0.05 10*3/mm3    nRBC 0.0 0.0 - 0.2 /100 WBC         Review of Systems    Objective     /96   Pulse 61   Temp 98.6 °F (37 °C) (Infrared)   Ht 160 cm (63\")   Wt 96.6 kg (213 lb)   SpO2 98%   BMI 37.73 kg/m²     Physical Exam  Vitals and nursing note reviewed.   Constitutional:       Appearance: Normal appearance. She is obese. She is not ill-appearing.   HENT:      Head: Normocephalic.      Right Ear: External ear normal.      Left Ear: External ear normal.      Nose: Nose normal.      Mouth/Throat:      Mouth: Mucous membranes are moist.   Eyes:      Pupils: Pupils are equal, round, and reactive to light.   Cardiovascular:      Rate and Rhythm: Normal rate and regular rhythm.      Pulses: Normal pulses.      Heart sounds: Normal heart sounds.   Pulmonary:      Effort: Pulmonary " effort is normal.      Breath sounds: Normal breath sounds.   Abdominal:      General: Bowel sounds are normal.      Palpations: Abdomen is soft.   Skin:     General: Skin is warm.   Neurological:      General: No focal deficit present.      Mental Status: She is alert and oriented to person, place, and time.   Psychiatric:         Mood and Affect: Mood normal.         Behavior: Behavior normal.         Thought Content: Thought content normal.         Judgment: Judgment normal.         Result Review :                Assessment & Plan    Diagnoses and all orders for this visit:    1. Atherosclerosis of native coronary artery of native heart without angina pectoris (Primary)  Comments:  stable  Orders:  -     Lipid Panel; Future  -     Comprehensive Metabolic Panel; Future    2. Mixed hyperlipidemia  Comments:  labs ordered  Orders:  -     Lipid Panel; Future  -     Comprehensive Metabolic Panel; Future    3. Primary hypertension  Comments:  unstable increase carvedilol  Orders:  -     Lipid Panel; Future  -     Comprehensive Metabolic Panel; Future  -     Discontinue: carvedilol (COREG) 12.5 MG tablet; Take 1 tablet by mouth 2 (Two) Times a Day.  Dispense: 90 tablet; Refill: 3  -     Discontinue: carvedilol (COREG) 25 MG tablet; Take 1 tablet by mouth 2 (Two) Times a Day.  Dispense: 90 tablet; Refill: 1  -     carvedilol (COREG) 25 MG tablet; Take 1 tablet by mouth 2 (Two) Times a Day.  Dispense: 180 tablet; Refill: 1    4. B12 deficiency  Comments:  labs ordered  Orders:  -     Cancel: Vitamin B12  -     Vitamin B12; Future    5. Vitamin D deficiency  Comments:  labs ordered  Orders:  -     Vitamin D,25-Hydroxy; Future    6. Class 2 severe obesity due to excess calories with serious comorbidity and body mass index (BMI) of 37.0 to 37.9 in adult  Comments:  we talked about daily exercise portion control.  Assessment & Plan:  Patient's (Body mass index is 37.73 kg/m².) indicates that they are obese (BMI >30) with  health conditions that include hypertension and dyslipidemias . Weight is improving with lifestyle modifications. BMI  is above average; BMI management plan is completed. We discussed portion control and increasing exercise.       7. Hyperlipidemia, unspecified hyperlipidemia type  Comments:  labs done  Orders:  -     atorvastatin (LIPITOR) 10 MG tablet; Take 1 tablet by mouth Daily.  Dispense: 90 tablet; Refill: 3    8. Need for immunization against influenza  -     Fluzone High-Dose 65+yrs ([id number])    Other orders  -     Discontinue: hydroCHLOROthiazide (HYDRODIURIL) 12.5 MG tablet; Take 1 tablet by mouth Daily.  Dispense: 90 tablet; Refill: 0  -     hydroCHLOROthiazide (HYDRODIURIL) 12.5 MG tablet; Take 1 tablet by mouth Daily.  Dispense: 90 tablet; Refill: 0      Patient Instructions   May need new blood pressure monitor  Start 25 mg carvedilol bid   Limit salt intake. Processed foods.   Try eating beets help lower blood pressure   Weight loss and exercise.       Follow Up   Return in about 1 month (around 12/16/2023).    Patient was given instructions and counseling regarding her condition or for health maintenance advice. Please see specific information pulled into the AVS if appropriate.     Chantell Church, APRN    11/17/23

## 2023-11-17 PROBLEM — Z23 NEED FOR IMMUNIZATION AGAINST INFLUENZA: Status: RESOLVED | Noted: 2023-08-08 | Resolved: 2023-11-17

## 2024-02-05 RX ORDER — CYANOCOBALAMIN 1000 UG/ML
1000 INJECTION, SOLUTION INTRAMUSCULAR; SUBCUTANEOUS
Qty: 10 ML | Refills: 0 | Status: SHIPPED | OUTPATIENT
Start: 2024-02-05

## 2024-03-07 ENCOUNTER — TELEPHONE (OUTPATIENT)
Dept: FAMILY MEDICINE CLINIC | Facility: CLINIC | Age: 69
End: 2024-03-07
Payer: MEDICARE

## 2024-04-09 ENCOUNTER — TELEPHONE (OUTPATIENT)
Dept: FAMILY MEDICINE CLINIC | Facility: CLINIC | Age: 69
End: 2024-04-09
Payer: MEDICARE